# Patient Record
Sex: FEMALE | Race: WHITE | Employment: FULL TIME | URBAN - METROPOLITAN AREA
[De-identification: names, ages, dates, MRNs, and addresses within clinical notes are randomized per-mention and may not be internally consistent; named-entity substitution may affect disease eponyms.]

---

## 2021-01-28 DIAGNOSIS — F17.200 TOBACCO DEPENDENCE: ICD-10-CM

## 2021-01-28 DIAGNOSIS — I10 ESSENTIAL HYPERTENSION: ICD-10-CM

## 2021-01-28 RX ORDER — OXYBUTYNIN CHLORIDE 5 MG/1
5 TABLET ORAL 3 TIMES DAILY
COMMUNITY

## 2021-01-28 RX ORDER — PAROXETINE 30 MG/1
30 TABLET, FILM COATED ORAL EVERY MORNING
COMMUNITY

## 2021-01-28 RX ORDER — LOSARTAN POTASSIUM AND HYDROCHLOROTHIAZIDE 25; 100 MG/1; MG/1
1 TABLET ORAL DAILY
COMMUNITY

## 2021-02-01 DIAGNOSIS — Z01.818 PRE-OP TESTING: Primary | ICD-10-CM

## 2021-02-03 ENCOUNTER — TELEPHONE (OUTPATIENT)
Dept: GASTROENTEROLOGY | Age: 61
End: 2021-02-03

## 2021-02-10 ENCOUNTER — TELEPHONE (OUTPATIENT)
Dept: ENDOSCOPY | Age: 61
End: 2021-02-10

## 2021-02-24 ENCOUNTER — OFFICE VISIT (OUTPATIENT)
Dept: CARDIOLOGY CLINIC | Age: 61
End: 2021-02-24
Payer: MEDICARE

## 2021-02-24 VITALS
SYSTOLIC BLOOD PRESSURE: 130 MMHG | DIASTOLIC BLOOD PRESSURE: 70 MMHG | HEART RATE: 70 BPM | TEMPERATURE: 97.1 F | WEIGHT: 184 LBS | OXYGEN SATURATION: 96 %

## 2021-02-24 DIAGNOSIS — I10 HYPERTENSION, UNSPECIFIED TYPE: ICD-10-CM

## 2021-02-24 DIAGNOSIS — I20.8 ANGINA AT REST (HCC): Primary | ICD-10-CM

## 2021-02-24 PROCEDURE — G8421 BMI NOT CALCULATED: HCPCS | Performed by: INTERNAL MEDICINE

## 2021-02-24 PROCEDURE — 99244 OFF/OP CNSLTJ NEW/EST MOD 40: CPT | Performed by: INTERNAL MEDICINE

## 2021-02-24 PROCEDURE — G8427 DOCREV CUR MEDS BY ELIG CLIN: HCPCS | Performed by: INTERNAL MEDICINE

## 2021-02-24 PROCEDURE — G8484 FLU IMMUNIZE NO ADMIN: HCPCS | Performed by: INTERNAL MEDICINE

## 2021-02-24 ASSESSMENT — ENCOUNTER SYMPTOMS
ALLERGIC/IMMUNOLOGIC NEGATIVE: 1
VOMITING: 0
ANAL BLEEDING: 0
VOICE CHANGE: 0
APNEA: 0
COLOR CHANGE: 0
CHEST TIGHTNESS: 0
EYES NEGATIVE: 1
TROUBLE SWALLOWING: 0
GASTROINTESTINAL NEGATIVE: 1
ABDOMINAL DISTENTION: 0
FACIAL SWELLING: 0
NAUSEA: 0
WHEEZING: 0
BLOOD IN STOOL: 0
SHORTNESS OF BREATH: 0

## 2021-02-24 NOTE — PROGRESS NOTES
[facility] CARDIOLOGY OFFICE FOLLOW-UP      Patient: Lobito Carolina  YOB: 1960  MRN: [id number]    Chief Complaint:  Chief Complaint   Patient presents with    New Patient     Rosana Frederick referred ABN EKG         Subjective/HPI:  2/23/21: Patient presents today for evaluation of abnormal EKG. Consulted by Rosana Frederick. 56years old white female. Has history of hypertension. And smokes. Has had endoscopy with Dr. Briana Palomo and referred for evaluation of chest pain. Has vague chest discomfort whether is due to reflux or angina has no notes. She has moderate risk for coronary artery disease. Scheduled to have a [employer] [place] [employer] and echo and then see me         Past Medical History:   Diagnosis Date    Essential hypertension     Obesity     Tobacco dependence        No past surgical history on file. No family history on file. Social History     Socioeconomic History    Marital status: Single     Spouse name: Not on file    Number of children: Not on file    Years of education: Not on file    Highest education level: Not on file   Occupational History    Not on file   Social Needs    Financial resource strain: Not on file    Food insecurity     Worry: Not on file     Inability: Not on file    Transportation needs     Medical: Not on file     Non-medical: Not on file   Tobacco Use    Smoking status: Former Smoker    Smokeless tobacco: Current User   Substance and Sexual Activity    Alcohol use:  Yes    Drug use: Never    Sexual activity: Not on file   Lifestyle    Physical activity     Days per week: Not on file     Minutes per session: Not on file    Stress: Not on file   Relationships    Social connections     Talks on phone: Not on file     Gets together: Not on file     Attends Zoroastrian service: Not on file     Active member of club or organization: Not on file     Attends meetings of clubs or organizations: Not on file     Relationship status: Not on file    Intimate partner violence     Fear of current or ex partner: Not on file     Emotionally abused: Not on file     Physically abused: Not on file     Forced sexual activity: Not on file   Other Topics Concern    Not on file   Social History Narrative    Not on file       Allergies   Allergen Reactions    Latex Rash    Lisinopril        Current Outpatient Medications   Medication Sig Dispense Refill    losartan-hydroCHLOROthiazide (HYZAAR) 100-25 MG per tablet Take 1 tablet by mouth daily      oxybutynin (DITROPAN) 5 MG tablet Take 5 mg by mouth 3 times daily      PARoxetine (PAXIL) 30 MG tablet Take 30 mg by mouth every morning       No current facility-administered medications for this visit. Review of Systems:   Review of Systems   Constitutional: Negative for activity change, appetite change, diaphoresis, fatigue and unexpected weight change. HENT: Negative. Negative for facial swelling, nosebleeds, trouble swallowing and voice change. Eyes: Negative. Respiratory: Negative for apnea, chest tightness, shortness of breath and wheezing. Cardiovascular: Negative for chest pain, palpitations and leg swelling. Gastrointestinal: Negative. Negative for abdominal distention, anal bleeding, blood in stool, nausea and vomiting. Endocrine: Negative. Genitourinary: Negative. Negative for decreased urine volume and dysuria. Musculoskeletal: Negative for gait problem and myalgias. Skin: Negative. Negative for color change, pallor, rash and wound. Allergic/Immunologic: Negative. Neurological: Negative for dizziness, syncope, facial asymmetry, weakness, light-headedness, numbness and headaches. Hematological: Does not bruise/bleed easily. Psychiatric/Behavioral: Negative. Negative for agitation, behavioral problems, confusion, hallucinations and suicidal ideas. The patient is not nervous/anxious and is not hyperactive. All other systems reviewed and are negative.       Review of System is negative except for as mentioned above. Physical Examination:    /70 (Site: Right Upper Arm, Position: Sitting, Cuff Size: Large Adult)   Pulse 70   Temp 97.1 °F (36.2 °C) (Infrared)   Wt 184 lb (83.5 kg)   SpO2 96%    Physical Exam        Patient Active Problem List   Diagnosis    Tobacco dependence    Obesity    Essential hypertension           Orders Placed This Encounter   Procedures    NM Myocardial Spect Rest Exercise or Rx     Standing Status:   Future     Standing Expiration Date:   2/24/2022     Order Specific Question:   Reason for Exam?     Answer:   Angina     Order Specific Question:   Procedure Type     Answer:   Rx    Echo 2D w doppler w color complete     Standing Status:   Future     Standing Expiration Date:   2/24/2022     Order Specific Question:   Reason for exam:     Answer:   angina         No orders of the defined types were placed in this encounter. Assessment/Orders:       ICD-10-CM    1. Angina at rest [facility])  I20.8 NM Myocardial Spect Rest Exercise or Rx     Echo 2D w doppler w color complete   2. Hypertension, unspecified type  I10 NM Myocardial Spect Rest Exercise or Rx     Echo 2D w doppler w color complete       No orders of the defined types were placed in this encounter. There are no discontinued medications. Orders Placed This Encounter   Procedures    NM Myocardial Spect Rest Exercise or Rx     Standing Status:   Future     Standing Expiration Date:   2/24/2022     Order Specific Question:   Reason for Exam?     Answer:   Angina     Order Specific Question:   Procedure Type     Answer:   Rx    Echo 2D w doppler w color complete     Standing Status:   Future     Standing Expiration Date:   2/24/2022     Order Specific Question:   Reason for exam:     Answer:   angina         Plan:  Patient to have Echo and Jar[facility] Stress tests    Stay on same medications.     See me in 3 weeks after testing        Electronically signed by: Rosa Isela Yip MD 3/3/2021 7:16 AM

## 2021-03-17 ENCOUNTER — NURSE ONLY (OUTPATIENT)
Dept: PRIMARY CARE CLINIC | Age: 61
End: 2021-03-17

## 2021-03-17 DIAGNOSIS — Z01.818 PRE-OP TESTING: ICD-10-CM

## 2021-03-18 LAB
SARS-COV-2: NOT DETECTED
SOURCE: NORMAL

## 2021-03-24 ENCOUNTER — HOSPITAL ENCOUNTER (OUTPATIENT)
Age: 61
Setting detail: OUTPATIENT SURGERY
Discharge: HOME | End: 2021-03-24
Attending: INTERNAL MEDICINE | Admitting: INTERNAL MEDICINE
Payer: MEDICARE

## 2021-03-24 ENCOUNTER — ANESTHESIA (OUTPATIENT)
Dept: ENDOSCOPY | Age: 61
End: 2021-03-24
Payer: MEDICARE

## 2021-03-24 ENCOUNTER — ANCILLARY PROCEDURE (OUTPATIENT)
Dept: ENDOSCOPY | Age: 61
End: 2021-03-24
Attending: INTERNAL MEDICINE
Payer: MEDICARE

## 2021-03-24 ENCOUNTER — ANESTHESIA EVENT (OUTPATIENT)
Dept: ENDOSCOPY | Age: 61
End: 2021-03-24
Payer: MEDICARE

## 2021-03-24 VITALS
TEMPERATURE: 98.5 F | RESPIRATION RATE: 16 BRPM | BODY MASS INDEX: 30.73 KG/M2 | DIASTOLIC BLOOD PRESSURE: 67 MMHG | WEIGHT: 180 LBS | SYSTOLIC BLOOD PRESSURE: 147 MMHG | HEART RATE: 52 BPM | HEIGHT: 64 IN | OXYGEN SATURATION: 100 %

## 2021-03-24 VITALS
DIASTOLIC BLOOD PRESSURE: 72 MMHG | SYSTOLIC BLOOD PRESSURE: 138 MMHG | RESPIRATION RATE: 15 BRPM | OXYGEN SATURATION: 99 %

## 2021-03-24 PROCEDURE — 45380 COLONOSCOPY AND BIOPSY: CPT | Performed by: INTERNAL MEDICINE

## 2021-03-24 PROCEDURE — 88305 TISSUE EXAM BY PATHOLOGIST: CPT

## 2021-03-24 PROCEDURE — 3700000001 HC ADD 15 MINUTES (ANESTHESIA): Performed by: INTERNAL MEDICINE

## 2021-03-24 PROCEDURE — 2580000003 HC RX 258

## 2021-03-24 PROCEDURE — 3700000000 HC ANESTHESIA ATTENDED CARE: Performed by: INTERNAL MEDICINE

## 2021-03-24 PROCEDURE — 2580000003 HC RX 258: Performed by: NURSE ANESTHETIST, CERTIFIED REGISTERED

## 2021-03-24 PROCEDURE — 7100000010 HC PHASE II RECOVERY - FIRST 15 MIN: Performed by: INTERNAL MEDICINE

## 2021-03-24 PROCEDURE — 3609027000 HC COLONOSCOPY: Performed by: INTERNAL MEDICINE

## 2021-03-24 PROCEDURE — 2709999900 HC NON-CHARGEABLE SUPPLY: Performed by: INTERNAL MEDICINE

## 2021-03-24 PROCEDURE — 2580000003 HC RX 258: Performed by: INTERNAL MEDICINE

## 2021-03-24 RX ORDER — MAGNESIUM HYDROXIDE 1200 MG/15ML
LIQUID ORAL PRN
Status: DISCONTINUED | OUTPATIENT
Start: 2021-03-24 | End: 2021-03-24 | Stop reason: ALTCHOICE

## 2021-03-24 RX ORDER — SODIUM CHLORIDE 9 MG/ML
INJECTION, SOLUTION INTRAVENOUS
Status: DISCONTINUED
Start: 2021-03-24 | End: 2021-03-24 | Stop reason: HOSPADM

## 2021-03-24 RX ORDER — 0.9 % SODIUM CHLORIDE 0.9 %
500 INTRAVENOUS SOLUTION INTRAVENOUS ONCE
Status: DISCONTINUED | OUTPATIENT
Start: 2021-03-24 | End: 2021-03-24 | Stop reason: HOSPADM

## 2021-03-24 RX ORDER — SODIUM CHLORIDE 9 MG/ML
INJECTION, SOLUTION INTRAVENOUS CONTINUOUS PRN
Status: DISCONTINUED | OUTPATIENT
Start: 2021-03-24 | End: 2021-03-24 | Stop reason: SDUPTHER

## 2021-03-24 RX ORDER — PROPOFOL 10 MG/ML
INJECTION, EMULSION INTRAVENOUS CONTINUOUS PRN
Status: DISCONTINUED | OUTPATIENT
Start: 2021-03-24 | End: 2021-03-24 | Stop reason: SDUPTHER

## 2021-03-24 RX ADMIN — SODIUM CHLORIDE 500 ML: 9 INJECTION, SOLUTION INTRAVENOUS at 11:34

## 2021-03-24 RX ADMIN — PROPOFOL 120 MCG/KG/MIN: 10 INJECTION, EMULSION INTRAVENOUS at 12:11

## 2021-03-24 RX ADMIN — Medication 500 ML: at 11:34

## 2021-03-24 RX ADMIN — SODIUM CHLORIDE: 9 INJECTION, SOLUTION INTRAVENOUS at 12:04

## 2021-03-24 ASSESSMENT — PAIN SCALES - GENERAL
PAINLEVEL_OUTOF10: 0
PAINLEVEL_OUTOF10: 0

## 2021-03-24 ASSESSMENT — PAIN - FUNCTIONAL ASSESSMENT: PAIN_FUNCTIONAL_ASSESSMENT: 0-10

## 2021-03-24 NOTE — H&P
Patient Name: Tamika Ramos  :   MRN: [id number]  DATE: 21      ENDOSCOPY  History and Physical    Procedure:    [] Diagnostic Colonoscopy       [x] Screening Colonoscopy  [] EGD      [] ERCP      [] EUS       [] Other    [x] Previous office notes/History and Physical reviewed from the patients chart. Please see EMR for further details of HPI. I have examined the patient's status immediately prior to the procedure and:      Indications/HPI:    []Abdominal Pain   []Cancer- GI/Lung  []Fhx of colon CA  []History of Polyps   []Tejedas   []Melena  []Abnormal Imaging   []Dysphagia    []Persistent Pneumonia  []Anemia   []Food Impaction  []History of Polyps  []GI Bleed   []Pulmonary nodule/Mass  []Change in bowel habits  []Heartburn/Reflux  []Rectal Bleed (BRBPR)  []Chest Pain - Non Cardiac  []Heme (+) Stool  []Ulcers  []Constipation   []Hemoptysis   []Varices  []Diarrhea   []Hypoxemia  []Nausea/Vomiting   [x]Screening   []Crohns/Colitis  []Other:    Anesthesia:   [x] MAC [] Moderate Sedation   [] General   [] None     ROS: 12 pt Review of Symptoms was negative unless mentioned above    Medications:   Prior to Admission medications    Medication Sig Start Date End Date Taking? Authorizing Provider   losartan-hydroCHLOROthiazide (HYZAAR) 100-25 MG per tablet Take 1 tablet by mouth daily   Yes Historical Provider, MD   oxybutynin (DITROPAN) 5 MG tablet Take 5 mg by mouth 3 times daily   Yes Historical Provider, MD   PARoxetine (PAXIL) 30 MG tablet Take 30 mg by mouth every morning    Historical Provider, MD       Allergies:    Allergies   Allergen Reactions    Latex Rash    Lisinopril         History of allergic reaction to anesthesia:  No    Past Medical History:  Past Medical History:   Diagnosis Date    Essential hypertension     Obesity     Tobacco dependence        Past Surgical History:  Past Surgical History:   Procedure Laterality Date    HYSTERECTOMY      TONSILLECTOMY         Social History:  Social History     Tobacco Use    Smoking status: Former Smoker     Packs/day: 0.50     Types: Cigarettes     Quit date: 3/24/2009     Years since quittin.0    Smokeless tobacco: Never Used   Substance Use Topics    Alcohol use: Yes     Alcohol/week: 4.0 standard drinks     Types: 4 Cans of beer per week     Comment: weekly    Drug use: Never       Vital Signs:   Vitals:    21 1118   BP: (!) 165/82   Pulse: 57   Resp: 18   Temp: 98.5 °F (36.9 °C)   SpO2: 97%        Physical Exam:  Cardiac:  [x]WNL []Comments:  Pulmonary:  [x]WNL   []Comments:   Neuro/Mental Status:  [x]WNL  []Comments:  Abdominal:  [x]WNL    []Comments:  Other:   []WNL  []Comments:    Informed Consent:  The risks and benefits of the procedure have been discussed with either the patient or if they cannot consent, their representative. Assessment:  Patient examined and appropriate for planned sedation and procedure. Plan:  Proceed with planned sedation and procedure as above. The patient was counseled at length about risks of bonnie COVID-19 in the perioperative and any recovery window from the procedure. The patient was made aware that bonnie COVID-19 may worsen their prognosis for recovery from their procedure and lend to a higher morbidity and-all mortality risk. The patient was given the option of postponing the procedure all material risks, benefits, and alternatives were discussed. The patient does wish to proceed with the procedure at this time.     David Cheema MD  11:35 AM

## 2021-03-24 NOTE — ANESTHESIA POSTPROCEDURE EVALUATION
Department of Anesthesiology  Postprocedure Note    Patient: Dolores Darden  MRN: [id number]  YOB: 1960  Date of evaluation: 3/24/2021  Time:  12:26 PM     Procedure Summary     Date: 03/24/21 Room / Location: [address] [place] [address]    Anesthesia Start: 5489 Anesthesia Stop: 2788    Procedure: COLORECTAL CANCER SCREENING, NOT HIGH RISK (N/A ) Diagnosis: (colon cancer screening Z12.11)    Surgeons: Unruly Yanez MD Responsible Provider: TOM Trevino CRNA    Anesthesia Type: MAC ASA Status: 3          Anesthesia Type: MAC    Janey Phase I: Janey Score: 10    Janey Phase II:      Last vitals: Reviewed and per EMR flowsheets.        Anesthesia Post Evaluation    Patient location during evaluation: PACU  Patient participation: complete - patient participated  Level of consciousness: awake and alert  Pain score: 0  Airway patency: patent  Nausea & Vomiting: no nausea and no vomiting  Complications: no  Cardiovascular status: blood pressure returned to baseline and hemodynamically stable  Respiratory status: acceptable, spontaneous ventilation, nonlabored ventilation and nasal cannula  Hydration status: euvolemic

## 2021-03-24 NOTE — ANESTHESIA PRE PROCEDURE
Department of Anesthesiology  Preprocedure Note       Name:  Kelly Graham   Age:  56 y.o.  :  1960                                          MRN:  [id number]         Date:  3/24/2021      Surgeon: Melani Sumner):  Pavel Fernandes MD    Procedure: Procedure(s):  COLORECTAL CANCER SCREENING, NOT HIGH RISK    Medications prior to admission:   Prior to Admission medications    Medication Sig Start Date End Date Taking? Authorizing Provider   losartan-hydroCHLOROthiazide (HYZAAR) 100-25 MG per tablet Take 1 tablet by mouth daily   Yes Historical Provider, MD   oxybutynin (DITROPAN) 5 MG tablet Take 5 mg by mouth 3 times daily   Yes Historical Provider, MD   PARoxetine (PAXIL) 30 MG tablet Take 30 mg by mouth every morning    Historical Provider, MD       Current medications:    Current Facility-Administered Medications   Medication Dose Route Frequency Provider Last Rate Last Admin    0.9 % sodium chloride bolus  500 mL Intravenous Once Pavel Fernandes  mL/hr at 21 1134 500 mL at 21 1134    sterile water for irrigation    PRN Pavel Fernandes MD   1,000 mL at 21 1157       Allergies: Allergies   Allergen Reactions    Latex Rash    Lisinopril        Problem List:    Patient Active Problem List   Diagnosis Code    Tobacco dependence F17.200    Obesity E66.9    Essential hypertension I10       Past Medical History:        Diagnosis Date    Essential hypertension     Obesity     Tobacco dependence        Past Surgical History:        Procedure Laterality Date    HYSTERECTOMY      TONSILLECTOMY         Social History:    Social History     Tobacco Use    Smoking status: Former Smoker     Packs/day: 0.50     Types: Cigarettes     Quit date: 3/24/2009     Years since quittin.0    Smokeless tobacco: Never Used   Substance Use Topics    Alcohol use:  Yes     Alcohol/week: 4.0 standard drinks     Types: 4 Cans of beer per week     Comment: weekly                                Counseling given: Not Answered      Vital Signs (Current):   Vitals:    03/24/21 1118   BP: (!) 165/82   Pulse: 57   Resp: 18   Temp: 36.9 °C (98.5 °F)   TempSrc: Temporal   SpO2: 97%   Weight: 180 lb (81.6 kg)   Height: 5' 4\" (1.626 m)                                              BP Readings from Last 3 Encounters:   03/24/21 (!) 165/82   02/24/21 130/70       NPO Status: Time of last liquid consumption: 0800                        Time of last solid consumption: 1600                        Date of last liquid consumption: 03/24/21                        Date of last solid food consumption: 03/22/21    BMI:   Wt Readings from Last 3 Encounters:   03/24/21 180 lb (81.6 kg)   02/24/21 184 lb (83.5 kg)     Body mass index is 30.9 kg/m². CBC: No results found for: WBC, RBC, HGB, HCT, MCV, RDW, PLT    CMP: No results found for: NA, K, CL, CO2, BUN, CREATININE, GFRAA, AGRATIO, LABGLOM, GLUCOSE, PROT, CALCIUM, BILITOT, ALKPHOS, AST, ALT    POC Tests: No results for input(s): POCGLU, POCNA, POCK, POCCL, POCBUN, POCHEMO, POCHCT in the last 72 hours.     Coags: No results found for: PROTIME, INR, APTT    HCG (If Applicable): No results found for: PREGTESTUR, PREGSERUM, HCG, HCGQUANT     ABGs: No results found for: PHART, PO2ART, PTP9LGW, NKK8QBP, BEART, M5UNREEY     Type & Screen (If Applicable):  No results found for: LABABO, LABRH    Drug/Infectious Status (If Applicable):  No results found for: HIV, HEPCAB    COVID-19 Screening (If Applicable):   Lab Results   Component Value Date    COVID19 Not Detected 03/17/2021           Anesthesia Evaluation  Patient summary reviewed and Nursing notes reviewed  Airway: Mallampati: II  TM distance: >3 FB   Neck ROM: full  Mouth opening: > = 3 FB Dental: normal exam         Pulmonary:                              Cardiovascular:                      Neuro/Psych:               GI/Hepatic/Renal:             Endo/Other:                     Abdominal:           Vascular: Anesthesia Plan      MAC     ASA 3             Anesthetic plan and risks discussed with patient. Use of blood products discussed with patient whom consented to blood products.                    TOM Avalos - CRNA   3/24/2021

## 2021-11-08 ENCOUNTER — OFFICE VISIT (OUTPATIENT)
Dept: GASTROENTEROLOGY | Age: 61
End: 2021-11-08
Payer: MEDICARE

## 2021-11-08 VITALS — BODY MASS INDEX: 30.56 KG/M2 | WEIGHT: 179 LBS | HEIGHT: 64 IN | OXYGEN SATURATION: 100 % | HEART RATE: 78 BPM

## 2021-11-08 DIAGNOSIS — R11.0 NAUSEA: ICD-10-CM

## 2021-11-08 DIAGNOSIS — R10.9 ABDOMINAL PAIN, UNSPECIFIED ABDOMINAL LOCATION: ICD-10-CM

## 2021-11-08 DIAGNOSIS — K52.9 CHRONIC DIARRHEA: Primary | ICD-10-CM

## 2021-11-08 DIAGNOSIS — F10.11 HISTORY OF ALCOHOL ABUSE: ICD-10-CM

## 2021-11-08 DIAGNOSIS — K43.9 VENTRAL HERNIA WITHOUT OBSTRUCTION OR GANGRENE: ICD-10-CM

## 2021-11-08 PROCEDURE — G8484 FLU IMMUNIZE NO ADMIN: HCPCS | Performed by: NURSE PRACTITIONER

## 2021-11-08 PROCEDURE — 99214 OFFICE O/P EST MOD 30 MIN: CPT | Performed by: NURSE PRACTITIONER

## 2021-11-08 PROCEDURE — 3017F COLORECTAL CA SCREEN DOC REV: CPT | Performed by: NURSE PRACTITIONER

## 2021-11-08 PROCEDURE — G8417 CALC BMI ABV UP PARAM F/U: HCPCS | Performed by: NURSE PRACTITIONER

## 2021-11-08 PROCEDURE — G8427 DOCREV CUR MEDS BY ELIG CLIN: HCPCS | Performed by: NURSE PRACTITIONER

## 2021-11-08 PROCEDURE — 1036F TOBACCO NON-USER: CPT | Performed by: NURSE PRACTITIONER

## 2021-11-08 RX ORDER — OXYBUTYNIN CHLORIDE 5 MG/1
TABLET, EXTENDED RELEASE ORAL
COMMUNITY
Start: 2021-11-03 | End: 2022-01-07

## 2021-11-08 ASSESSMENT — ENCOUNTER SYMPTOMS
BLOOD IN STOOL: 0
RECTAL PAIN: 0
TROUBLE SWALLOWING: 0
DIARRHEA: 1
CONSTIPATION: 0
VOMITING: 0
EYES NEGATIVE: 1
ANAL BLEEDING: 0
NAUSEA: 1
ABDOMINAL DISTENTION: 0
VOICE CHANGE: 0
CHOKING: 0
ABDOMINAL PAIN: 0

## 2021-11-08 NOTE — PROGRESS NOTES
Gastroenterology Clinic Visit    Onofre Keith  [id number]  Chief Complaint   Patient presents with    Follow-up     Nausea, diarrhea     HPI: 59 y.o. female presents to the clinic, referred by her PCP at [facility] and [address] [place] [address], for generalized abdominal pain. Patient denies experiencing abdominal pain, except when she has to move/lift things at work. States her PCP diagnosed her with an abdominal hernia. Patient reports experiencing nausea (without vomiting) couple of times per week. She states nothing makes this worse or better. She denies GERD symptoms, vomiting, hematemesis, dysphagia or unintentional weight loss. She also reports longstanding history of diarrhea that began in her early 46s. States that she used to have 6-8 liquid/loose bowel movements per day. She reports being a heavy alcohol drinker off and on since her early 25s. States she quit drinking alcohol in July of this year and since that time her bowel movements have decreased to around 3 per day. She does report her first bowel movement of the day being soft/firm. However, after she eats she proceeds to have 1-2 episodes of diarrhea per day with occasional incontinence to feces. She uses Imodium as needed when going out. States she does not eat much while at work for fear of diarrhea with fecal incontinence. She denies hematochezia, melena or constipation. She reports her weight is stable. However, she works in [employer] and reports indulging on sweets since she quit drinking alcohol. She is a former smoker, history of EtOH abuse (quit in July of this year), denies illicit drug use. Denies NSAID use. She does report history of uterine cancer, s/p hysterectomy and radiation therapy in 2005. She reports her PCP has ordered multiple tests including CT of the abdomen pelvis, FibroScan of her liver, CBC, CMP, amylase and lipase. States she has not completed these tests yet. However, will do so in the near future.     Previous GI work up/Endoscopic investigations:   Colonoscopy 3/24/21: Diminutive ascending colon polyp, resected and retrieved with biopsy forceps. Left-sided diverticulosis. Medium-sized external hemorrhoids. Pathology: Ascending colon polyptubular adenoma. Colonic mucosa with small lymphoid nodule. Review of Systems   Constitutional: Negative for appetite change, fatigue, fever and unexpected weight change. HENT: Negative for trouble swallowing and voice change. Eyes: Negative. Respiratory: Negative for choking. Cardiovascular: Negative. Gastrointestinal: Positive for diarrhea and nausea. Negative for abdominal distention, abdominal pain, anal bleeding, blood in stool, constipation, rectal pain and vomiting. Endocrine: Negative. Genitourinary: Negative. Musculoskeletal: Negative. Skin: Negative. Allergic/Immunologic: Negative for food allergies. Neurological: Negative. Hematological: Negative. Psychiatric/Behavioral: Negative for agitation, decreased concentration, self-injury, sleep disturbance and suicidal ideas. The patient is not nervous/anxious. Past Medical History:   Diagnosis Date    Essential hypertension     Obesity     Tobacco dependence      Past Surgical History:   Procedure Laterality Date    COLONOSCOPY N/A 3/24/2021    COLORECTAL CANCER SCREENING, NOT HIGH RISK performed by Sherlyn Barreto MD at [address] [place] [address]       Current Outpatient Medications on File Prior to Visit   Medication Sig Dispense Refill    losartan-hydroCHLOROthiazide (HYZAAR) 100-25 MG per tablet Take 1 tablet by mouth daily      oxybutynin (DITROPAN) 5 MG tablet Take 5 mg by mouth 3 times daily      PARoxetine (PAXIL) 30 MG tablet Take 30 mg by mouth every morning      oxybutynin (DITROPAN-XL) 5 MG extended release tablet  (Patient not taking: Reported on 11/8/2021)       No current facility-administered medications on file prior to visit. Stability:     Unable to Pay for Housing in the Last Year: Not on file    Number of Places Lived in the Last Year: Not on file    Unstable Housing in the Last Year: Not on file     Pulse 78, height 5' 4\" (1.626 m), weight 179 lb (81.2 kg), SpO2 100 %. Physical Exam  Constitutional:       General: She is not in acute distress. Appearance: Normal appearance. She is normal weight. She is not ill-appearing. HENT:      Head: Normocephalic and atraumatic. Eyes:      General: No scleral icterus. Cardiovascular:      Rate and Rhythm: Normal rate and regular rhythm. Pulses: Normal pulses. Pulmonary:      Effort: Pulmonary effort is normal. No respiratory distress. Breath sounds: Normal breath sounds. Abdominal:      General: Abdomen is protuberant. Bowel sounds are normal. There is no distension. Palpations: Abdomen is soft. There is no mass. Tenderness: There is no abdominal tenderness. There is no guarding or rebound. Hernia: A hernia is present. Hernia is present in the ventral area. Comments: Central obesity   Musculoskeletal:         General: Normal range of motion. Lymphadenopathy:      Cervical: No cervical adenopathy. Skin:     General: Skin is warm and dry. Coloration: Skin is not jaundiced. Neurological:      Mental Status: She is alert and oriented to person, place, and time. Psychiatric:         Mood and Affect: Mood normal.         Behavior: Behavior normal.         Thought Content: Thought content normal.         Judgment: Judgment normal.       Laboratory, Pathology, Radiology reviewed indetail with relevant important investigations summarized below:    No recent laboratory results found. Patient has some ordered by her PCP and will complete today. No recent GI imaging results found. Assessment and Plan:  59 y.o. female presents to the GI clinic with abdominal pain with movement, nausea, and chronic diarrhea.       1. Abdominal pain, unspecified abdominal location  2. Ventral hernia without obstruction or gangrene  -Physical exam revealing a fairly large, nonreducible ventral hernia.   -Will await CT completion of her previously ordered CT of the abdomen/pelvis. -Referral made to general surgery, Dr. Shannan Silva, for evaluation of hernia/possible repair. 3. Nausea  -Nausea is occasional, no vomiting, GERD symptoms or dysphagia. Will continue to monitor. 4. Chronic diarrhea  -Suspect chronic diarrhea is diet-related as patient admits to improved consistency of bowel movements after cessation of alcohol use. Recent colonoscopy in March of this year revealing 2 small polyps, left-sided diverticulosis, and medium sized external hemorrhoids. She continues to have a few episodes of daily diarrhea with occasional fecal incontinence. 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    -Will check stool pH, potassium, sodium to further evaluate.   -Discussed with patient the importance of abstaining from dietary sugars/artificial sweeteners as well and to monitor her symptoms.  -Recommend starting fiber supplementation, 1 spoonful in 8 ounces of water daily, titrate upwards as needed to produce 1-2 formed bowel movements per day. -Continue Imodium OTC as needed    5. History of alcohol abuse  -Will await lab work (Sebast[facility] Ultr[facility] 112) and fibroscan ordered by patient's PCP. Further recommendations pending patient's clinical course. Return for Follow up in GI clinic in 4-6 weeks.     TOM Walker - [facility]   Staff Gastroenterology Nurse Practitioner  [facility]

## 2021-11-08 NOTE — PATIENT INSTRUCTIONS
-Start fiber supplement 1 spoonful daily, can go up or down as needed. - Get Ct of the abdomen and fibsroscan done as ordered by Dr. Jahaira Ashley, obtain copies if not done at [facility]. -Get labwork done as ordered by Dr. Jahaira Ashley.     -Stool studies, make sure it is a loose stool, not formed.

## 2021-11-10 DIAGNOSIS — K52.9 CHRONIC DIARRHEA: ICD-10-CM

## 2021-11-13 LAB — FECAL PH: 7 (ref 5–8.5)

## 2021-12-08 ENCOUNTER — HOSPITAL ENCOUNTER (OUTPATIENT)
Dept: PHYSICAL THERAPY | Age: 61
Setting detail: THERAPIES SERIES
Discharge: HOME | End: 2021-12-08
Payer: MEDICARE

## 2021-12-08 PROCEDURE — 97750 PHYSICAL PERFORMANCE TEST: CPT | Performed by: PHYSICAL THERAPIST

## 2021-12-08 NOTE — PROGRESS NOTES
FUNCTIONAL CAPACITIES EVALUATION  DISABILITY      CLIENT:   Shila Pod    DATE:    12/8/2021    DIAGNOSIS:   Ventral hernia, obesity     REFERRAL SOURCE:  Barb SANTOS  PAYMENT SOURCE:  Payor: [employer]     YOB: 1960    MEDICAL HISTORY    Age:     59 y.o. Medical History:     Past Medical History:   Diagnosis Date    Essential hypertension     Obesity     Tobacco dependence        Previous injuries or surgery:   Past Surgical History:   Procedure Laterality Date    COLONOSCOPY N/A 3/24/2021    COLORECTAL CANCER SCREENING, NOT HIGH RISK performed by Ragini Solomon MD at [address] [place] [address]          Past medical problems:   Patient is here today for a FCE to determine her ability to work. She works at [employer] and [employer], but must lift, push carts, and jannet out garbage. When she does this she gets a lot of pressure through her ventral hernia. She is scheduled to see a surgeon in the next week or so to see if she will be a surgical candidate. She also states that she quit drinking about six months ago. Today she states she does not have any pain. Current Medications:     Current Outpatient Medications   Medication Sig Dispense Refill    oxybutynin (DITROPAN-XL) 5 MG extended release tablet  (Patient not taking: Reported on 11/8/2021)      losartan-hydroCHLOROthiazide (HYZAAR) 100-25 MG per tablet Take 1 tablet by mouth daily      oxybutynin (DITROPAN) 5 MG tablet Take 5 mg by mouth 3 times daily      PARoxetine (PAXIL) 30 MG tablet Take 30 mg by mouth every morning       No current facility-administered medications for this encounter. WORK HISTORY    Job title:    [employer]  How long at current job: 3 years  Off work since:   Currently working  Previous work:   [employer].   She was a  for 30 years in [employer]:  Lives with alone in a 1 level home   Self-Care Dressing Independent    Bathing Independent   Home Management Cooking Independent    Cleaning Independent    Laundry Independent   Sleep Good: 9 hours  Bad:1 hours      Driving Distance Independent       Educational Background:      AS in Photography from [address]  right handed    RANGE OF MOTION/FLEXIBILITY    Upper Extremity WNL - Within Normal Limits WFL - Within Functional Limits  *Strength grades:  0=Absent    1=Trace    2=Poor    3=Fair    4=Good    5=Normal*  RIGHT ROM STRENGTH LEFT ROM STRENGTH   Shoulder Shoulder   Flexion WNL 4/5 Flexion WNL 4/5   Extension WNL 4/5 Extension WNL 4/5   Abduction WNL 4/5 Abduction WNL 4/5   Ext. Rot. WNL 4/5 Ext. Rot. WNL 4/5   Int. Rot. WNL 4/5 Int. Rot. WNL 4/5   Elbow Elbow   Flexion WNL 4/5 Flexion WNL 4/5   Extension WNL 4/5 Extension WNL 4/5   Forearm Forearm   Supination WNL 4/5 Supination WNL 4/5   Pronation WNL 4/5 Pronation WNL 4/5   Wrist Wrist   Flexion WNL 4/5 Flexion WNL 4/5   Extension WNL 4/5 Extension WNL 4/5   Radial Dev WNL 4/5 Radial Dev WNL 4/5   Ulnar Dev WNL 4/5 Ulnar Dev WNL 4/5   Hand  Hand    Finger flex/ext. WNL 4/5 Finger flex/ext.  WNL 4/5     Lower Extremity  RIGHT ROM STRENGTH LEFT ROM STRENGTH   Hip Hip   SLR 0-90 NT SLR 0-90 NT   Flexion 0-110 4/5 Flexion 0-120 4/5   Extension WNL 4/5 Extension WNL 4/5   Abduction WNL 4-/5 Abduction WNL 4-/5   Knee Knee   Flexion WNL 4/5 Flexion WNL 4/5   Extension WNL 4/5 Extension WNL 4/5   Ankle Ankle   Dorsiflexion WNL 5/5 Dorsiflexion WNL 5/5   Plantarflexion WNL 5/5 Plantarflexion WNL 5/5     Trunk Cervical   Flexion  0-40 Flexion  WNL   Extension 0-20 Extension WNL   Rotation R WNL L WNL  Rotation R WNL L WNL    Side bend R WNL L WNL  Side bend R WNL L WNL     Trunk:    Abdominals:  4-/5    Extensors:   4-/5        Strength (pounds)     Setting Right Norm Left Norm   #1 32 Female  age 63-62: 48 lbs 29 Female age 63-62: 45 lbs    #2 43  28    #3 44 36    #4 30  36    #5 26  23    Average 32  31.6      Rapid Exchange :      Right: 40 lbs. Left: 40 lbs. Interpretation: With maximal effort, the curve should be a modified bell-shaped curve. With true weakness, the bell curve is lower, and with simulated weakness, the curve will be that or somewhat curved, but distinguishable from the modified bell-shaped curve of the unaffected hand. Right Norm Left Norm   Palmar Pinch (3 pt) - pounds 15 Female age 63-62: 10.5 lbs  12.5 Female age 63-62: 9.5 lbs    Lateral Pinch - pounds 13.5 Female age 63-62: 11.5 lbs  15 Female age 63-62: 10.5 lbs      Comments: Client did demonstrate maximal effort with  and pinch testing. COORDINATION/DEXTERITY  Test Right Norm Left Norm   9 Hole Peg Test (seconds) 16.4 Female age 63-62: 24.2 s  25.7 Female age 63-62: 20.6 s        POSTURE  A postural screen revealed flattened low back. MATERIAL HANDLING ACTIVITIES  (Weight in pounds)    Lifting Occasional  (1-33% of day) Frequent  (34-55% of day) Constant  (% of day)   12 to knuckle 30 15 6   Knuckle to waist 30 15 6   Waist to chest 20 10 4   Waist to overhead 20 10 4   Carrying 25 12.5 5   Horizontal 35 17.5 7   Pushing 80 40 16   Pulling 80 40 1   For frequent and constant figures, data is extrapolated. The occasional lift is tested by one maximal lift. Client demonstrated  GOOD body mechanics with lifting tasks. Physical Demand Classification: medium  Patient reports that with lifting she gets increased pressure in her abdomen and specifically at the large hernia site.     NON-MATERIAL HANDLING ACTIVITIES    Activity Never Rarely Occasional  (1-33% of day) Frequent  (34-66% of day) Constant  (% of day)   Sitting [] [] [x] [] []   Standing [] [] [] [x] []   Bending [] [] [] [x] []   Reaching (forward) [] [] [] [x] []   Reaching (overhead) [] [] [x] [] []   Stairs [] [] [x] [] []   Ladders [] [x] [] [] []   Squatting [] [] [] [x] []   Kneeling [] [] [x] [] []   Walking [] [] [] [x] []   Balancing [] [x] [] [] []   Repetitive leg [] [] [] [x] []   Repetitive arm [] [] [] [x] []   Hand controls [] [] [] [x] []   Foot controls [] [] [] [x] []   Comments: Non-material handling projections are based upon patient report and observation during the evaluation. SIT AND STAND TOLERANCE    Reported sit:  60 minutes  Reported stand: 5-6 hours ( per her work time currently)  Reported walk:  30 minutes  Actual sit:  30 minutes during testing  Actual stand:  15 minutes during testing  Actual walk:  10 minutes for testing      FUNCTIONAL ASSETS  Prompt to evaluation     Cooperative and able to follow directions, verbalize concerns  Good  and pinch strength  Good upper and lower extremity range of motion and flexibility  Good upper and lower extremity strength  Good lift and carry tolerance for medium physical demand classification  Good knowledge and application of body mechanics with lifting tasks  Good sit/stand tolerance   Good coordination/dexterity     PROBLEMS INTERFERING WITH VOCATIONAL PERFORMANCE  Decreased workplace tolerance in regards to lifting beyond 30#, carrying 20#  Decreased trunk motion        PHYSICAL ABILITIES AND LIMITATIONS  STAND at one time []None []15 min []30 min [x]60 min []2 hrs. []4 hrs   STAND total per 8hr day []None []60 min []2 hrs []4 hrs. [x]6 hrs []8 hrs. SIT at one time []None []15 min []30 min [x]60 min []2 hrs. []4 hrs. SIT total per 8hr day []None []15 min []30 min []60 min []2 hrs. [x]4 hrs. LIFT & CARRY occasionally [] 0-5 lbs. [] 10 lbs. [x] 20 lbs. [] 50 lbs. LIFT & CARRY frequently [] 0-5 lbs. [x] 10 lbs. [] 20 lbs. [] 50 lbs. STOOP [] Never []Occasionally [x]Frequently []Constantly   BALANCE [x] Never []Occasionally [] Frequently []Constantly       ABILITY TO WORK  PART-TIME: Patient demonstrates the physical abilities for the medium job classification.   However due to the hernia and increased pressure when she lifts, would highly recommend that she have a lifting restriction of 30#, and carry 20#. If she requires surgery for her hernia in the near future, then she will need to be on disability until she is cleared by her surgeon to return to work.                        Electronically signed by Leatha Varma PT on 12/8/21 at 10:08 AM EST  Therapist Signature    [facility]     __________________________  Physician Signature

## 2021-12-15 ENCOUNTER — OFFICE VISIT (OUTPATIENT)
Dept: GASTROENTEROLOGY | Age: 61
End: 2021-12-15
Payer: MEDICARE

## 2021-12-15 VITALS — WEIGHT: 179 LBS | HEART RATE: 79 BPM | HEIGHT: 64 IN | OXYGEN SATURATION: 100 % | BODY MASS INDEX: 30.56 KG/M2

## 2021-12-15 DIAGNOSIS — K74.00 HEPATIC FIBROSIS: ICD-10-CM

## 2021-12-15 DIAGNOSIS — F10.11 HISTORY OF ALCOHOL ABUSE: ICD-10-CM

## 2021-12-15 DIAGNOSIS — R11.0 NAUSEA: ICD-10-CM

## 2021-12-15 DIAGNOSIS — R10.9 ABDOMINAL PAIN, UNSPECIFIED ABDOMINAL LOCATION: ICD-10-CM

## 2021-12-15 DIAGNOSIS — R74.01 ELEVATED ALT MEASUREMENT: Primary | ICD-10-CM

## 2021-12-15 DIAGNOSIS — K43.9 VENTRAL HERNIA WITHOUT OBSTRUCTION OR GANGRENE: ICD-10-CM

## 2021-12-15 DIAGNOSIS — K52.9 CHRONIC DIARRHEA: ICD-10-CM

## 2021-12-15 PROCEDURE — 3017F COLORECTAL CA SCREEN DOC REV: CPT | Performed by: NURSE PRACTITIONER

## 2021-12-15 PROCEDURE — 99214 OFFICE O/P EST MOD 30 MIN: CPT | Performed by: NURSE PRACTITIONER

## 2021-12-15 PROCEDURE — 1036F TOBACCO NON-USER: CPT | Performed by: NURSE PRACTITIONER

## 2021-12-15 PROCEDURE — G8417 CALC BMI ABV UP PARAM F/U: HCPCS | Performed by: NURSE PRACTITIONER

## 2021-12-15 PROCEDURE — G8427 DOCREV CUR MEDS BY ELIG CLIN: HCPCS | Performed by: NURSE PRACTITIONER

## 2021-12-15 PROCEDURE — G8484 FLU IMMUNIZE NO ADMIN: HCPCS | Performed by: NURSE PRACTITIONER

## 2021-12-15 NOTE — PROGRESS NOTES
Gastroenterology Clinic Follow up Visit    Natalia Braswell  [id number]  Chief Complaint   Patient presents with    Follow-up     HPI: 59 y.o. female following up after last GI clinic on 11/8/2021. Interval change: Patient reports continued nausea (a couple of times per week). States it seems to worsen prior to having a bowel movement. She denies symptoms of GERD or dysphagia. States that she started taking a single dose of Motrin daily after work for generalized pains. She does endorse abdominal discomfort related to her large ventral hernia. States she has not had the CT abdomen pelvis done yet (ordered by her PCP) as insurance has not yet approved it. She reports she is starting to make an effort to eat healthily/limit portion sizes and increase her physical activity. Although, she has not noticed any weight loss yet. She reports improvement in her diarrheal symptoms. Currently endorsing 2-3 formed/brown/soft bowel movements daily. Has not used Imodium lately. She denies hematochezia or melena. She denies any history of liver disease or family history of hereditary liver disease. Patient's risk factors for liver disease include history of EtOH abuse. She denies IV drug use, intranasal cocaine, tattoos/piercings, high risk sexual behavior, transfusions prior to [facility], or herbal supplements. HPI from last GI clinic visit on 11/8/2021  summarized below:  59 y.o. female presents to the clinic, referred by her PCP at [facility] and [address] [place] [address], for generalized abdominal pain. Patient denies experiencing abdominal pain, except when she has to move/lift things at work. States her PCP diagnosed her with an abdominal hernia. Patient reports experiencing nausea (without vomiting) couple of times per week. She states nothing makes this worse or better. She denies GERD symptoms, vomiting, hematemesis, dysphagia or unintentional weight loss.    She also reports longstanding history of diarrhea that began in her early 46s. States that she used to have 6-8 liquid/loose bowel movements per day. She reports being a heavy alcohol drinker off and on since her early 25s. States she quit drinking alcohol in July of this year and since that time her bowel movements have decreased to around 3 per day. She does report her first bowel movement of the day being soft/firm. However, after she eats she proceeds to have 1-2 episodes of diarrhea per day with occasional incontinence to feces. She uses Imodium as needed when going out. States she does not eat much while at work for fear of diarrhea with fecal incontinence. She denies hematochezia, melena or constipation. She reports her weight is stable. However, she works in [employer] and reports indulging on sweets since she quit drinking alcohol. She is a former smoker, history of EtOH abuse (quit in July of this year), denies illicit drug use. Denies NSAID use. She does report history of uterine cancer, s/p hysterectomy and radiation therapy in 2005. She reports her PCP has ordered multiple tests including CT of the abdomen pelvis, FibroScan of her liver, CBC, CMP, amylase and lipase. States she has not completed these tests yet. However, will do so in the near future.     Previous GI work up/Endoscopic investigations:   Colonoscopy 3/24/21: Diminutive ascending colon polyp, resected and retrieved with biopsy forceps. Left-sided diverticulosis. Medium-sized external hemorrhoids. Pathology: Ascending colon polyptubular adenoma. Colonic mucosa with small lymphoid nodule. Review of Systems   Constitutional: Negative for appetite change, fatigue, fever and unexpected weight change. HENT: Negative for trouble swallowing and voice change. Eyes: Negative. Respiratory: Negative for choking. Cardiovascular: Negative. Gastrointestinal: Positive for abdominal distention, abdominal pain and nausea.  Negative for anal bleeding, blood in stool, constipation, diarrhea, rectal pain and vomiting. Endocrine: Negative. Genitourinary: Negative. Musculoskeletal: Negative. Skin: Negative. Allergic/Immunologic: Negative for food allergies. Neurological: Negative. Hematological: Negative. Psychiatric/Behavioral: Negative for agitation, decreased concentration, self-injury, sleep disturbance and suicidal ideas. The patient is not nervous/anxious. Past medical history, past surgical history, medication list, social and familyhistory reviewed    Pulse 79, height 5' 4\" (1.626 m), weight 179 lb (81.2 kg), SpO2 100 %. Physical Exam  Constitutional:       General: She is not in acute distress. Appearance: Normal appearance. She is normal weight. She is not ill-appearing. HENT:      Head: Normocephalic and atraumatic. Eyes:      General: No scleral icterus. Cardiovascular:      Rate and Rhythm: Normal rate and regular rhythm. Pulses: Normal pulses. Pulmonary:      Effort: Pulmonary effort is normal. No respiratory distress. Breath sounds: Normal breath sounds. Abdominal:      General: Bowel sounds are normal. There is no distension. Palpations: Abdomen is soft. There is no mass. Tenderness: There is no abdominal tenderness. There is no guarding or rebound. Hernia: A hernia is present. Hernia is present in the ventral area (mild tenderness, nonreducible). Comments: Central obesity   Musculoskeletal:         General: Normal range of motion. Lymphadenopathy:      Cervical: No cervical adenopathy. Skin:     General: Skin is warm and dry. Coloration: Skin is not jaundiced. Neurological:      Mental Status: She is alert and oriented to person, place, and time. Psychiatric:         Mood and Affect: Mood normal.         Behavior: Behavior normal.         Thought Content:  Thought content normal.         Judgment: Judgment normal.       Laboratory, Pathology, Radiology reviewed in detail with relevantimportant investigations summarized below:    Lab Results   Component Value Date    WBC 5.7 11/08/2021    HGB 13.3 11/08/2021    HCT 40.4 11/08/2021    MCV 87.7 11/08/2021     11/08/2021     Lab Results   Component Value Date    ALT 41 (H) 11/08/2021    AST 26 11/08/2021    ALKPHOS 73 11/08/2021    BILITOT 0.4 11/08/2021     Component      Latest Ref Rng & Units 11/10/2021          11:07 AM   Fecal pH      5.0 - 8.5 7.0     Fibrosure testing on 11/8/21: revealed portal fibrosis with possible few septa (predominance of F1 but F2 is possible). Assessment and Plan:  Bairon Bsuby 59 y.o. female with:     1. Abdominal pain, unspecified abdominal location  2. Ventral hernia without obstruction or gangrene  -Physical exam consistent with large, nonreducible ventral hernia. -Referral made to surgery, Dr. Fierro, for evaluation of hernia/possible repair. 3. Nausea  -Per patient, nausea worsens prior to bowel movement. No alarm symptoms. Will monitor. 4. Chronic diarrhea  -Currently resolved with dietary changes and cessation of alcohol use. Fecal pH within normal limits. If recurs, patient may use OTC Imodium as needed. 5. History of alcohol abuse  6. Elevated ALT measurement  7. Hepatic fibrosis  -Patient with longstanding history of alcohol abuse, she is currently abstaining from alcohol at this time. Recent serum fibrosure testing ordered by PCP consistent with some degree of hepatic fibrosis (F1, however F2 possible). Suspect multiple factors contributing to hepatic fibrosis including hepatic steatosis and history of EtOH abuse.  -Will obtain FibroScan for further classification of fibrosis. -Recommend Mediterranean-style diet and increased activity with goal of 5% weight loss in the next couple months. Return 2 weeks after fibroscan to go over results.     TOM Richardson - CNP   Staff Gastroenterology Nurse Practitioner  [facility]

## 2021-12-19 ASSESSMENT — ENCOUNTER SYMPTOMS
RECTAL PAIN: 0
DIARRHEA: 0
ABDOMINAL PAIN: 1
ANAL BLEEDING: 0
VOICE CHANGE: 0
ABDOMINAL DISTENTION: 1
BLOOD IN STOOL: 0
CHOKING: 0
CONSTIPATION: 0
TROUBLE SWALLOWING: 0
VOMITING: 0
NAUSEA: 1
EYES NEGATIVE: 1

## 2022-01-04 ENCOUNTER — OFFICE VISIT (OUTPATIENT)
Dept: SURGERY | Age: 62
End: 2022-01-04
Payer: MEDICARE

## 2022-01-04 VITALS
TEMPERATURE: 96.8 F | WEIGHT: 183 LBS | HEIGHT: 64 IN | HEART RATE: 67 BPM | OXYGEN SATURATION: 98 % | BODY MASS INDEX: 31.24 KG/M2

## 2022-01-04 DIAGNOSIS — K43.9 VENTRAL HERNIA WITHOUT OBSTRUCTION OR GANGRENE: Primary | ICD-10-CM

## 2022-01-04 PROCEDURE — G8427 DOCREV CUR MEDS BY ELIG CLIN: HCPCS | Performed by: COLON & RECTAL SURGERY

## 2022-01-04 PROCEDURE — 99203 OFFICE O/P NEW LOW 30 MIN: CPT | Performed by: COLON & RECTAL SURGERY

## 2022-01-04 PROCEDURE — 1036F TOBACCO NON-USER: CPT | Performed by: COLON & RECTAL SURGERY

## 2022-01-04 PROCEDURE — G8417 CALC BMI ABV UP PARAM F/U: HCPCS | Performed by: COLON & RECTAL SURGERY

## 2022-01-04 PROCEDURE — G8484 FLU IMMUNIZE NO ADMIN: HCPCS | Performed by: COLON & RECTAL SURGERY

## 2022-01-04 PROCEDURE — 3017F COLORECTAL CA SCREEN DOC REV: CPT | Performed by: COLON & RECTAL SURGERY

## 2022-01-04 ASSESSMENT — ENCOUNTER SYMPTOMS
SHORTNESS OF BREATH: 0
ABDOMINAL PAIN: 1
BACK PAIN: 0
CHEST TIGHTNESS: 0
COLOR CHANGE: 0
VOMITING: 0
CONSTIPATION: 0
DIARRHEA: 0

## 2022-01-04 NOTE — PROGRESS NOTES
Subjective:      Patient ID: Callum Hennessy is a 59 y.o. female who presents for:  Chief Complaint   Patient presents with   [facility] Patient       This is a 57-year-old female who was referred for an abdominal wall hernia. She has had this bulge present for a number of years but cannot tell me how long she thinks its been there. She had a colonoscopy earlier this year with a benign polyp and diverticular disease noted. She denies weight loss or any family history of colon disease including cancer. Past Medical History:   Diagnosis Date    Essential hypertension     Obesity     Tobacco dependence      Past Surgical History:   Procedure Laterality Date    COLONOSCOPY N/A 3/24/2021    COLORECTAL CANCER SCREENING, NOT HIGH RISK performed by Gino Edge MD at [address] [place] [address] History     Socioeconomic History    Marital status: Single     Spouse name: Not on file    Number of children: Not on file    Years of education: Not on file    Highest education level: Not on file   Occupational History    Not on file   Tobacco Use    Smoking status: Former Smoker     Packs/day: 0.50     Types: Cigarettes     Quit date: 3/24/2009     Years since quittin.7    Smokeless tobacco: Never Used   Vaping Use    Vaping Use: Every day    Substances: Nicotine    Devices: Pre-filled or refillable cartridge   Substance and Sexual Activity    Alcohol use:  Yes     Alcohol/week: 4.0 standard drinks     Types: 4 Cans of beer per week     Comment: weekly    Drug use: Never    Sexual activity: Not on file   Other Topics Concern    Not on file   Social History Narrative    Not on file     Social Determinants of Health     Financial Resource Strain:     Difficulty of Paying Living Expenses: Not on file   Food Insecurity:     Worried About Running Out of Food in the Last Year: Not on file    Brittany of Food in the Last Year: Not on file   Transportation Needs:  Lack of Transportation (Medical): Not on file    Lack of Transportation (Non-Medical): Not on file   Physical Activity:     Days of Exercise per Week: Not on file    Minutes of Exercise per Session: Not on file   Stress:     Feeling of Stress : Not on file   Social Connections:     Frequency of Communication with Friends and Family: Not on file    Frequency of Social Gatherings with Friends and Family: Not on file    Attends Pentec[employer]alism Services: Not on file    Active Member of [address] [place] [address] or Organizations: Not on file    Attends Club or Organization Meetings: Not on file    Marital Status: Not on file   Intimate Partner Violence:     Fear of Current or Ex-Partner: Not on file    Emotionally Abused: Not on file    Physically Abused: Not on file    Sexually Abused: Not on file   Housing Stability:     Unable to Pay for Housing in the Last Year: Not on file    Number of [place] in the Last Year: Not on file    Unstable Housing in the Last Year: Not on file     Family History   Problem Relation Age of Onset    Colon Cancer Paternal Grandfather      Allergies:  Latex and Lisinopril    Review of Systems   Constitutional: Negative for activity change, appetite change and unexpected weight change. HENT: Negative for congestion. Respiratory: Negative for chest tightness and shortness of breath. Cardiovascular: Negative for chest pain. Gastrointestinal: Positive for abdominal pain. Negative for constipation, diarrhea and vomiting. Pain described over the hernia site on activity   Genitourinary: Negative for difficulty urinating. Musculoskeletal: Negative for back pain. Skin: Negative for color change. Neurological: Negative for dizziness and headaches. Hematological: Does not bruise/bleed easily. Psychiatric/Behavioral: Negative for agitation.        Objective:    Pulse 67   Temp 96.8 °F (36 °C) (Temporal)   Ht 5' 4\" (1.626 m)   Wt 183 lb (83 kg)   SpO2 98%   BMI 31.41 kg/m² Physical Exam  Constitutional:       Appearance: She is well-developed. HENT:      Head: Normocephalic and atraumatic. Eyes:      Pupils: Pupils are equal, round, and reactive to light. Cardiovascular:      Rate and Rhythm: Normal rate and regular rhythm. Heart sounds: Normal heart sounds. Pulmonary:      Effort: Pulmonary effort is normal. No respiratory distress. Breath sounds: Normal breath sounds. No wheezing. Abdominal:      General: There is no distension. Palpations: There is no mass. Tenderness: There is no abdominal tenderness. There is no guarding or rebound. Hernia: A hernia is present. Comments: She has an incarcerated hernia right of the umbilicus not associated with any incision. No skin changes are present. Non reducible. Suspect omental incarceration present   Musculoskeletal:         General: Normal range of motion. Cervical back: Normal range of motion and neck supple. Skin:     General: Skin is warm and dry. Coloration: Skin is not pale. Findings: No erythema or rash. Neurological:      Mental Status: She is alert and oriented to person, place, and time. Psychiatric:         Behavior: Behavior normal.         Judgment: Judgment normal.              Assessment/Plan:          Diagnosis Orders   1. Ventral hernia without obstruction or gangrene       With anatomic diagrams, I described the risks and benefits of ventral hernia repair with mesh. Risks of the procedure including infection, bleeding, damage to the underlying intestine recurrence reoperation and postoperative pain were all addressed. Nonoperative alternatives were given. Despite the risks, she wishes to proceed with ventral hernia repair with mesh. Consent obtained.             Please note this report has beenpartially produced using speech recognition software and may cause contain errors related to that system including grammar, punctuation and spelling as well as words and phrases that may seem inappropriate.  If there arequestions or concerns please feel free to contact me to clarify

## 2022-01-07 ENCOUNTER — TELEPHONE (OUTPATIENT)
Dept: GASTROENTEROLOGY | Age: 62
End: 2022-01-07

## 2022-01-07 ENCOUNTER — HOSPITAL ENCOUNTER (OUTPATIENT)
Dept: PREADMISSION TESTING | Age: 62
Discharge: HOME | End: 2022-01-11
Payer: MEDICARE

## 2022-01-07 VITALS
BODY MASS INDEX: 32.07 KG/M2 | HEART RATE: 50 BPM | RESPIRATION RATE: 16 BRPM | HEIGHT: 63 IN | WEIGHT: 181 LBS | SYSTOLIC BLOOD PRESSURE: 154 MMHG | OXYGEN SATURATION: 98 % | TEMPERATURE: 96.8 F | DIASTOLIC BLOOD PRESSURE: 89 MMHG

## 2022-01-07 LAB
ANION GAP SERPL CALCULATED.3IONS-SCNC: 10 MEQ/L (ref 9–15)
BUN BLDV-MCNC: 15 MG/DL (ref 8–23)
CALCIUM SERPL-MCNC: 9.7 MG/DL (ref 8.5–9.9)
CHLORIDE BLD-SCNC: 99 MEQ/L (ref 95–107)
CO2: 28 MEQ/L (ref 20–31)
CREAT SERPL-MCNC: 0.78 MG/DL (ref 0.5–0.9)
EKG ATRIAL RATE: 53 BPM
EKG P AXIS: 3 DEGREES
EKG P-R INTERVAL: 112 MS
EKG Q-T INTERVAL: 450 MS
EKG QRS DURATION: 70 MS
EKG QTC CALCULATION (BAZETT): 422 MS
EKG R AXIS: 10 DEGREES
EKG T AXIS: 27 DEGREES
EKG VENTRICULAR RATE: 53 BPM
GFR AFRICAN AMERICAN: >60
GFR NON-AFRICAN AMERICAN: >60
GLUCOSE BLD-MCNC: 108 MG/DL (ref 70–99)
HCT VFR BLD CALC: 39 % (ref 37–47)
HEMOGLOBIN: 13.2 G/DL (ref 12–16)
MCH RBC QN AUTO: 29.6 PG (ref 27–31.3)
MCHC RBC AUTO-ENTMCNC: 33.9 % (ref 33–37)
MCV RBC AUTO: 87.3 FL (ref 82–100)
PDW BLD-RTO: 13.6 % (ref 11.5–14.5)
PLATELET # BLD: 209 K/UL (ref 130–400)
POTASSIUM SERPL-SCNC: 4.6 MEQ/L (ref 3.4–4.9)
RBC # BLD: 4.47 M/UL (ref 4.2–5.4)
SARS-COV-2, PCR: NOT DETECTED
SODIUM BLD-SCNC: 137 MEQ/L (ref 135–144)
WBC # BLD: 8.2 K/UL (ref 4.8–10.8)

## 2022-01-07 PROCEDURE — 93010 ELECTROCARDIOGRAM REPORT: CPT | Performed by: INTERNAL MEDICINE

## 2022-01-07 PROCEDURE — 93005 ELECTROCARDIOGRAM TRACING: CPT | Performed by: COLON & RECTAL SURGERY

## 2022-01-07 PROCEDURE — 85027 COMPLETE CBC AUTOMATED: CPT

## 2022-01-07 PROCEDURE — 87635 SARS-COV-2 COVID-19 AMP PRB: CPT

## 2022-01-07 PROCEDURE — 80048 BASIC METABOLIC PNL TOTAL CA: CPT

## 2022-01-07 RX ORDER — LIDOCAINE HYDROCHLORIDE 10 MG/ML
1 INJECTION, SOLUTION EPIDURAL; INFILTRATION; INTRACAUDAL; PERINEURAL
Status: CANCELLED | OUTPATIENT
Start: 2022-01-07 | End: 2022-01-07

## 2022-01-07 RX ORDER — SODIUM CHLORIDE 0.9 % (FLUSH) 0.9 %
10 SYRINGE (ML) INJECTION EVERY 12 HOURS SCHEDULED
Status: CANCELLED | OUTPATIENT
Start: 2022-01-07

## 2022-01-07 RX ORDER — SODIUM CHLORIDE 9 MG/ML
25 INJECTION, SOLUTION INTRAVENOUS PRN
Status: CANCELLED | OUTPATIENT
Start: 2022-01-07

## 2022-01-07 RX ORDER — SODIUM CHLORIDE 0.9 % (FLUSH) 0.9 %
10 SYRINGE (ML) INJECTION PRN
Status: CANCELLED | OUTPATIENT
Start: 2022-01-07

## 2022-01-07 RX ORDER — SODIUM CHLORIDE, SODIUM LACTATE, POTASSIUM CHLORIDE, CALCIUM CHLORIDE 600; 310; 30; 20 MG/100ML; MG/100ML; MG/100ML; MG/100ML
INJECTION, SOLUTION INTRAVENOUS CONTINUOUS
Status: CANCELLED | OUTPATIENT
Start: 2022-01-07

## 2022-01-07 NOTE — PROGRESS NOTES
Patient states her plans for discharge is Alton Gavin or Aniya Crum and she will get a female . Explained to patient,that is not our policy , she needs a family or friend to drive home after being sedated, it is a liability issue for the hospital.  Spoke with Bucky Baldwin, and Dr. Ernie Naranjo office called was called and spoke with [place], if patient does not have a  home, OR will be cancelled.

## 2022-01-27 ENCOUNTER — ANCILLARY PROCEDURE (OUTPATIENT)
Dept: ENDOSCOPY | Age: 62
End: 2022-01-27
Payer: MEDICARE

## 2022-01-27 DIAGNOSIS — R74.01 ELEVATED ALT MEASUREMENT: ICD-10-CM

## 2022-01-27 PROCEDURE — 91200 LIVER ELASTOGRAPHY: CPT

## 2022-01-27 PROCEDURE — 91200 LIVER ELASTOGRAPHY: CPT | Performed by: INTERNAL MEDICINE

## 2024-01-18 ENCOUNTER — OFFICE VISIT (OUTPATIENT)
Dept: SURGERY | Facility: CLINIC | Age: 64
End: 2024-01-18
Payer: COMMERCIAL

## 2024-01-18 ENCOUNTER — LAB (OUTPATIENT)
Dept: LAB | Facility: LAB | Age: 64
End: 2024-01-18
Payer: COMMERCIAL

## 2024-01-18 VITALS
DIASTOLIC BLOOD PRESSURE: 86 MMHG | HEART RATE: 61 BPM | BODY MASS INDEX: 28.17 KG/M2 | WEIGHT: 165 LBS | SYSTOLIC BLOOD PRESSURE: 136 MMHG | HEIGHT: 64 IN

## 2024-01-18 DIAGNOSIS — K43.9 VENTRAL HERNIA WITHOUT OBSTRUCTION OR GANGRENE: Primary | ICD-10-CM

## 2024-01-18 DIAGNOSIS — K43.9 VENTRAL HERNIA WITHOUT OBSTRUCTION OR GANGRENE: ICD-10-CM

## 2024-01-18 LAB
CREAT SERPL-MCNC: 1.15 MG/DL (ref 0.5–1.05)
EGFRCR SERPLBLD CKD-EPI 2021: 53 ML/MIN/1.73M*2

## 2024-01-18 PROCEDURE — 36415 COLL VENOUS BLD VENIPUNCTURE: CPT

## 2024-01-18 PROCEDURE — 82565 ASSAY OF CREATININE: CPT

## 2024-01-18 PROCEDURE — 99203 OFFICE O/P NEW LOW 30 MIN: CPT | Performed by: SURGERY

## 2024-01-18 PROCEDURE — 1159F MED LIST DOCD IN RCRD: CPT | Performed by: SURGERY

## 2024-01-18 PROCEDURE — 1036F TOBACCO NON-USER: CPT | Performed by: SURGERY

## 2024-01-18 RX ORDER — PAROXETINE 30 MG/1
30 TABLET, FILM COATED ORAL
COMMUNITY

## 2024-01-18 RX ORDER — OXYBUTYNIN CHLORIDE 5 MG/1
5 TABLET, EXTENDED RELEASE ORAL DAILY
COMMUNITY
Start: 2023-12-20

## 2024-01-18 ASSESSMENT — ENCOUNTER SYMPTOMS
ABDOMINAL PAIN: 1
ABDOMINAL DISTENTION: 1

## 2024-01-18 NOTE — PROGRESS NOTES
Subjective   Patient ID: Martir Granger is a 63 y.o. female who presents for New Patient Visit (Ventral hernia consult. ).  HPI  63-year-old female referred for moderately symptomatic abdominal wall bulge to the right of the umbilicus no obstructive symptoms.  She is status post hysterectomy with BSO in 2005 in [place] according to the patient.  This was done for uterine CA.  She states that postoperatively she had prolonged drainage no previous repairs.  She states she vapes  Review of Systems   Gastrointestinal:  Positive for abdominal distention and abdominal pain.   All other systems reviewed and are negative.      Objective   Physical Exam  Abdominal:      Comments: Abdomen soft chronic Raphael incarcerated nontender hernia to the right of the umbilicus approximately 5 x 5 cm although the edges are hard to define     Physical Exam  Constitutional:       Appearance: Normal appearance.   HENT:      Head: Normocephalic and atraumatic.      Mouth/Throat:      Pharynx: Oropharynx is clear.   Eyes:      Pupils: Pupils are equal, round, and reactive to light.   Cardiovascular:      Rate and Rhythm: Normal rate and regular rhythm.   Pulmonary:      Effort: Pulmonary effort is normal.      Breath sounds: Normal breath sounds.   Abdominal:      General: Abdomen is flat. Bowel sounds are normal.      Palpations: Abdomen is soft.   Musculoskeletal:         General: Normal range of motion.      Cervical back: Normal range of motion.   Skin:     General: Skin is warm.   Neurological:      General: No focal deficit present.      Mental Status: She is alert. Mental status is at baseline.   Psychiatric:         Mood and Affect: Mood normal.       Assessment/Plan   Ventral hernia with symptoms nonobstructive discussed with the patient she is willing to stop vaping prior to surgery we will proceed with CT abdomen pelvis and see back after that and discuss surgery         Avel Scanlon MD 01/18/24 1:34 PM

## 2024-02-01 ENCOUNTER — HOSPITAL ENCOUNTER (OUTPATIENT)
Dept: RADIOLOGY | Facility: HOSPITAL | Age: 64
Discharge: HOME | End: 2024-02-01
Payer: COMMERCIAL

## 2024-02-01 DIAGNOSIS — K43.9 VENTRAL HERNIA WITHOUT OBSTRUCTION OR GANGRENE: ICD-10-CM

## 2024-02-01 PROCEDURE — 2550000001 HC RX 255 CONTRASTS: Performed by: SURGERY

## 2024-02-01 PROCEDURE — 74177 CT ABD & PELVIS W/CONTRAST: CPT

## 2024-02-01 PROCEDURE — 74177 CT ABD & PELVIS W/CONTRAST: CPT | Performed by: STUDENT IN AN ORGANIZED HEALTH CARE EDUCATION/TRAINING PROGRAM

## 2024-02-01 RX ADMIN — IOHEXOL 75 ML: 350 INJECTION, SOLUTION INTRAVENOUS at 08:57

## 2024-02-07 ENCOUNTER — OFFICE VISIT (OUTPATIENT)
Dept: SURGERY | Facility: CLINIC | Age: 64
End: 2024-02-07
Payer: COMMERCIAL

## 2024-02-07 VITALS
BODY MASS INDEX: 28.17 KG/M2 | HEIGHT: 64 IN | DIASTOLIC BLOOD PRESSURE: 86 MMHG | WEIGHT: 165 LBS | SYSTOLIC BLOOD PRESSURE: 136 MMHG

## 2024-02-07 DIAGNOSIS — K43.9 VENTRAL HERNIA WITHOUT OBSTRUCTION OR GANGRENE: Primary | ICD-10-CM

## 2024-02-07 PROCEDURE — 1159F MED LIST DOCD IN RCRD: CPT | Performed by: SURGERY

## 2024-02-07 PROCEDURE — 1036F TOBACCO NON-USER: CPT | Performed by: SURGERY

## 2024-02-07 PROCEDURE — 99213 OFFICE O/P EST LOW 20 MIN: CPT | Performed by: SURGERY

## 2024-02-07 ASSESSMENT — ENCOUNTER SYMPTOMS
ABDOMINAL DISTENTION: 1
ABDOMINAL PAIN: 1

## 2024-02-07 NOTE — PROGRESS NOTES
Subjective   Patient ID: Martir Granger is a 63 y.o. female who presents for Follow-up (Follow up for CT results. ).  HPI  Patient returns after CAT scan continues to have the bulge right side no pain except with pressure patient continues to lose weight and improving her health overall  Review of Systems   Gastrointestinal:  Positive for abdominal distention and abdominal pain.   All other systems reviewed and are negative.      Objective   Physical Exam  Physical Exam  Constitutional:       Appearance: Normal appearance.   HENT:      Head: Normocephalic and atraumatic.      Mouth/Throat:      Pharynx: Oropharynx is clear.   Eyes:      Pupils: Pupils are equal, round, and reactive to light.   Cardiovascular:      Rate and Rhythm: Normal rate and regular rhythm.   Pulmonary:      Effort: Pulmonary effort is normal.      Breath sounds: Normal breath sounds.   Abdominal:      as before partially reducible ventral hernia minimally tender     musculoskeletal:         General: Normal range of motion.      Cervical back: Normal range of motion.   Skin:     General: Skin is warm.   Neurological:      General: No focal deficit present.      Mental Status: She is alert. Mental status is at baseline.   Psychiatric:         Mood and Affect: Mood normal.     Assessment/Plan   CT abdomen pelvis w IV contrast    Result Date: 2/1/2024  Interpreted By:  Marianna Kat, STUDY: CT ABDOMEN PELVIS W IV CONTRAST;  2/1/2024 9:01 am   INDICATION: Signs/Symptoms:Ventral hernia.   COMPARISON: None   ACCESSION NUMBER(S): [id number]   ORDERING CLINICIAN: MENDOZA PINEDA   TECHNIQUE: Axial CT of the abdomen and pelvis was performed following intravenous administration of 75 ml of contrast Omnipaque 350 with coronal and sagittal reconstruction.   FINDINGS: Lower chest: No focal consolidation or pleural effusion.   Liver: Multiple low-attenuation lesions, most of which are subcentimeter with the exception of one dominant mass in segment 3  measuring 2.9 cm (series 201, image 49).   Biliary: No intrahepatic or extrahepatic bile duct dilation. No cholelithiasis.   Spleen: No mass. No splenomegaly.   Pancreas: No mass or duct dilation.   Adrenals: Bilateral nodular thickening.   Kidneys: No suspicious mass, calculus or hydronephrosis.   GI tract: No bowel wall thickening or dilation. The appendix is normal. Colonic diverticula without acute inflammation.   Lymph nodes: Scattered enlarged bilateral inguinal lymph nodes, for example 1.8 cm right inguinal node (series 201, image 143). No intra-abdominal lymphadenopathy.   Mesentery/peritoneum: No ascites, free air or fluid collection.   Vasculature: Abdominal aortic atherosclerotic calcifications without aneurysmal dilation.   Pelvis: No ascites, free air or fluid collection.   Bones/Soft tissues: No acute osseous or abdominal wall soft tissue abnormalities.Multilevel degenerative disc disease, moderate L1-L2 and L5-S1. Midline lower abdominal wall postsurgical scarring. Fat containing right paramedian supraumbilical ventral hernia, hernia neck 3.4 cm, hernia sac 8.3 x 4.1 x 7.3 cm.       Fat containing right paramedian supraumbilical ventral hernia, hernia neck 3.4 cm, hernia sac 8.3 x 4.1 x 7.3 cm.   Nonspecific hepatic lesions up to 2.9 cm in segment 3. Consider further evaluation with dedicated liver MRI.   Nonspecific bilateral inguinal lymphadenopathy. Consider follow-up in 3-6 months.   MACRO: Critical Finding:  See findings. Notification was initiated on 2/1/2024 at 5:23 pm by  Marianna Kat.  (**-YCF-**) Instructions:   Signed by: Marianna Kat 2/1/2024 5:24 PM Dictation workstation:   [id number]       CT reviewed in detail with the patient recommend observation for now as she is asymptomatic or symptomatic minimally and there is no bowel in the hernia see back in 6 months reassess her health at the time symptoms may resolve patient agreeable with plan       Avel Scanlon MD 02/07/24 12:22 PM

## 2024-07-11 ENCOUNTER — HOSPITAL ENCOUNTER (EMERGENCY)
Facility: HOSPITAL | Age: 64
Discharge: HOME | End: 2024-07-11
Payer: COMMERCIAL

## 2024-07-11 VITALS
DIASTOLIC BLOOD PRESSURE: 89 MMHG | BODY MASS INDEX: 29.02 KG/M2 | RESPIRATION RATE: 18 BRPM | HEIGHT: 64 IN | OXYGEN SATURATION: 97 % | SYSTOLIC BLOOD PRESSURE: 179 MMHG | TEMPERATURE: 98.2 F | WEIGHT: 170 LBS | HEART RATE: 63 BPM

## 2024-07-11 DIAGNOSIS — L25.5 CONTACT DERMATITIS DUE TO PLANT: Primary | ICD-10-CM

## 2024-07-11 PROBLEM — M54.9 BACK PAIN: Status: ACTIVE | Noted: 2024-07-11

## 2024-07-11 PROCEDURE — 2500000001 HC RX 250 WO HCPCS SELF ADMINISTERED DRUGS (ALT 637 FOR MEDICARE OP): Performed by: PHYSICIAN ASSISTANT

## 2024-07-11 PROCEDURE — 99283 EMERGENCY DEPT VISIT LOW MDM: CPT

## 2024-07-11 PROCEDURE — 2500000004 HC RX 250 GENERAL PHARMACY W/ HCPCS (ALT 636 FOR OP/ED): Performed by: PHYSICIAN ASSISTANT

## 2024-07-11 PROCEDURE — 96372 THER/PROPH/DIAG INJ SC/IM: CPT | Performed by: PHYSICIAN ASSISTANT

## 2024-07-11 RX ORDER — PREDNISONE 20 MG/1
TABLET ORAL
Qty: 30 TABLET | Refills: 0 | Status: SHIPPED | OUTPATIENT
Start: 2024-07-11 | End: 2024-07-25

## 2024-07-11 RX ORDER — FAMOTIDINE 20 MG/1
20 TABLET, FILM COATED ORAL ONCE
Status: COMPLETED | OUTPATIENT
Start: 2024-07-11 | End: 2024-07-11

## 2024-07-11 RX ORDER — DIPHENHYDRAMINE HCL 25 MG
50 TABLET ORAL ONCE
Status: COMPLETED | OUTPATIENT
Start: 2024-07-11 | End: 2024-07-11

## 2024-07-11 RX ADMIN — FAMOTIDINE 20 MG: 20 TABLET, FILM COATED ORAL at 09:29

## 2024-07-11 RX ADMIN — DIPHENHYDRAMINE HYDROCHLORIDE 50 MG: 25 TABLET ORAL at 09:29

## 2024-07-11 RX ADMIN — METHYLPREDNISOLONE SODIUM SUCCINATE 125 MG: 125 INJECTION, POWDER, FOR SOLUTION INTRAMUSCULAR; INTRAVENOUS at 09:29

## 2024-07-11 ASSESSMENT — LIFESTYLE VARIABLES
EVER HAD A DRINK FIRST THING IN THE MORNING TO STEADY YOUR NERVES TO GET RID OF A HANGOVER: NO
EVER FELT BAD OR GUILTY ABOUT YOUR DRINKING: NO
HAVE PEOPLE ANNOYED YOU BY CRITICIZING YOUR DRINKING: NO
HAVE YOU EVER FELT YOU SHOULD CUT DOWN ON YOUR DRINKING: NO
TOTAL SCORE: 0

## 2024-07-11 ASSESSMENT — PAIN - FUNCTIONAL ASSESSMENT: PAIN_FUNCTIONAL_ASSESSMENT: 0-10

## 2024-07-11 ASSESSMENT — PAIN SCALES - GENERAL: PAINLEVEL_OUTOF10: 0 - NO PAIN

## 2024-07-11 NOTE — Clinical Note
Martir Granger was seen and treated in our emergency department on 7/11/2024.  She may return to work on 07/13/2024.       If you have any questions or concerns, please don't hesitate to call.      Marcus Patel PA-C

## 2024-07-11 NOTE — ED PROVIDER NOTES
HPI   Chief Complaint   Patient presents with    Facial Swelling     Patient was working in yard Tuesday and she has a rash to left arm and right eye is swollen       A 63-year-old female patient comes in the emergency department today with complaints of a rash to her left arm and her right side of her face.  States this all started Tuesday evening after she did a bunch of yard work.  States she woke up Wednesday and she had the rash to her face.  States this seems to has increased in some severity.  States she took some p.o. Benadryl as well as some topical Benadryl without much relief.  Denies any associated pain with this.  States is mildly itchy.  For this purpose comes in the emergency department today further evaluation.  No complaints this present time.                          No data recorded                   Patient History   Past Medical History:   Diagnosis Date    Type 2 diabetes mellitus (Multi)      Past Surgical History:   Procedure Laterality Date    COLONOSCOPY W/ POLYPECTOMY      OTHER SURGICAL HISTORY  09/12/2022    Total abdominal laparoscopic hysterectomy and bilateral salpingo-oophorectomy    OTHER SURGICAL HISTORY  09/12/2022    Tonsillectomy     Family History   Problem Relation Name Age of Onset    Lung cancer Mother      Lung cancer Father       Social History     Tobacco Use    Smoking status: Former     Types: Cigarettes    Smokeless tobacco: Former   Vaping Use    Vaping status: Every Day    Substances: Nicotine   Substance Use Topics    Alcohol use: Yes    Drug use: Yes     Types: Marijuana       Physical Exam   ED Triage Vitals [07/11/24 0852]   Temperature Heart Rate Respirations BP   36.8 °C (98.2 °F) 63 18 179/89      Pulse Ox Temp Source Heart Rate Source Patient Position   97 % Temporal Monitor --      BP Location FiO2 (%)     -- --       Physical Exam  Constitutional:       General: She is in acute distress (Mild).      Appearance: Normal appearance. She is not ill-appearing.    HENT:      Head: Normocephalic.      Comments: Right facial swelling as well as some periorbital swelling on the right with associated rash consistent with a contact dermatitis secondary to plants     Nose: Nose normal.   Eyes:      Extraocular Movements: Extraocular movements intact.      Conjunctiva/sclera: Conjunctivae normal.   Cardiovascular:      Rate and Rhythm: Normal rate and regular rhythm.   Pulmonary:      Effort: Pulmonary effort is normal. No respiratory distress.      Breath sounds: Normal breath sounds. No stridor. No wheezing.   Musculoskeletal:         General: Normal range of motion.      Cervical back: Normal range of motion.   Skin:     General: Skin is warm and dry.      Comments: Rash left arm consistent with contact dermatitis secondary to plants   Neurological:      General: No focal deficit present.      Mental Status: She is alert and oriented to person, place, and time. Mental status is at baseline.   Psychiatric:         Mood and Affect: Mood normal.         ED Course & MDM   Diagnoses as of 07/11/24 0913   Contact dermatitis due to plant       Medical Decision Making  A 63-year-old female patient comes in the emergency department today with complaints of a rash to her left arm and her right side of her face.  States this all started Tuesday evening after she did a bunch of yard work.  States she woke up Wednesday and she had the rash to her face.  States this seems to has increased in some severity.  States she took some p.o. Benadryl as well as some topical Benadryl without much relief.  Denies any associated pain with this.  States is mildly itchy.  For this purpose comes in the emergency department today further evaluation.  No complaints this present time.    Patient given IM Solu-Medrol p.o. Benadryl, p.o. Pepcid here in the emergency department.    With his contact dermatitis secondary to plants.  Will place patient on a long tapering prednisone dose.  Patient agrees with this  plan expressed full verbal understanding.  All questions answered.    Patient denies any vision changes or eye pain    Historians the patient    Diagnosis: Contact dermatitis secondary to plant        Procedure  Procedures     Marcus Patel PA-C  07/11/24 0925

## 2024-07-17 ENCOUNTER — APPOINTMENT (OUTPATIENT)
Dept: PRIMARY CARE | Facility: CLINIC | Age: 64
End: 2024-07-17
Payer: COMMERCIAL

## 2024-07-17 VITALS
HEIGHT: 63 IN | BODY MASS INDEX: 31.01 KG/M2 | SYSTOLIC BLOOD PRESSURE: 160 MMHG | WEIGHT: 175 LBS | DIASTOLIC BLOOD PRESSURE: 90 MMHG | TEMPERATURE: 96.8 F | HEART RATE: 68 BPM

## 2024-07-17 DIAGNOSIS — R59.0 INGUINAL ADENOPATHY: ICD-10-CM

## 2024-07-17 DIAGNOSIS — L25.5 CONTACT DERMATITIS DUE TO PLANT: ICD-10-CM

## 2024-07-17 DIAGNOSIS — Z13.29 THYROID DISORDER SCREEN: ICD-10-CM

## 2024-07-17 DIAGNOSIS — R73.9 HYPERGLYCEMIA: ICD-10-CM

## 2024-07-17 DIAGNOSIS — N18.31 STAGE 3A CHRONIC KIDNEY DISEASE (MULTI): Primary | ICD-10-CM

## 2024-07-17 DIAGNOSIS — I10 PRIMARY HYPERTENSION: ICD-10-CM

## 2024-07-17 DIAGNOSIS — Z00.00 ROUTINE GENERAL MEDICAL EXAMINATION AT HEALTH CARE FACILITY: ICD-10-CM

## 2024-07-17 DIAGNOSIS — Z13.6 SCREENING FOR CARDIOVASCULAR CONDITION: ICD-10-CM

## 2024-07-17 DIAGNOSIS — C54.1 ENDOMETRIAL CANCER (MULTI): ICD-10-CM

## 2024-07-17 DIAGNOSIS — E66.09 CLASS 1 OBESITY DUE TO EXCESS CALORIES WITH SERIOUS COMORBIDITY AND BODY MASS INDEX (BMI) OF 31.0 TO 31.9 IN ADULT: ICD-10-CM

## 2024-07-17 DIAGNOSIS — L23.7 POISON IVY DERMATITIS: ICD-10-CM

## 2024-07-17 DIAGNOSIS — K76.9 HEPATIC LESION: ICD-10-CM

## 2024-07-17 DIAGNOSIS — Z12.31 ENCOUNTER FOR SCREENING MAMMOGRAM FOR BREAST CANCER: ICD-10-CM

## 2024-07-17 PROCEDURE — 1123F ACP DISCUSS/DSCN MKR DOCD: CPT | Performed by: INTERNAL MEDICINE

## 2024-07-17 PROCEDURE — 1160F RVW MEDS BY RX/DR IN RCRD: CPT | Performed by: INTERNAL MEDICINE

## 2024-07-17 PROCEDURE — 1170F FXNL STATUS ASSESSED: CPT | Performed by: INTERNAL MEDICINE

## 2024-07-17 PROCEDURE — 3008F BODY MASS INDEX DOCD: CPT | Performed by: INTERNAL MEDICINE

## 2024-07-17 PROCEDURE — 1159F MED LIST DOCD IN RCRD: CPT | Performed by: INTERNAL MEDICINE

## 2024-07-17 PROCEDURE — 1036F TOBACCO NON-USER: CPT | Performed by: INTERNAL MEDICINE

## 2024-07-17 PROCEDURE — G0402 INITIAL PREVENTIVE EXAM: HCPCS | Performed by: INTERNAL MEDICINE

## 2024-07-17 PROCEDURE — 3077F SYST BP >= 140 MM HG: CPT | Performed by: INTERNAL MEDICINE

## 2024-07-17 PROCEDURE — 3080F DIAST BP >= 90 MM HG: CPT | Performed by: INTERNAL MEDICINE

## 2024-07-17 RX ORDER — TRIAMCINOLONE ACETONIDE 1 MG/G
OINTMENT TOPICAL 2 TIMES DAILY PRN
Qty: 60 G | Refills: 0 | Status: SHIPPED | OUTPATIENT
Start: 2024-07-17 | End: 2025-07-17

## 2024-07-17 RX ORDER — AMLODIPINE BESYLATE 5 MG/1
5 TABLET ORAL DAILY
Qty: 30 TABLET | Refills: 5 | Status: SHIPPED | OUTPATIENT
Start: 2024-07-17 | End: 2025-01-13

## 2024-07-17 ASSESSMENT — ENCOUNTER SYMPTOMS
CONSTITUTIONAL NEGATIVE: 1
GASTROINTESTINAL NEGATIVE: 1
HEMATOLOGIC/LYMPHATIC NEGATIVE: 1
MUSCULOSKELETAL NEGATIVE: 1
LOSS OF SENSATION IN FEET: 0
NEUROLOGICAL NEGATIVE: 1
CARDIOVASCULAR NEGATIVE: 1
FATIGUE: 0
DEPRESSION: 0
RESPIRATORY NEGATIVE: 1
OCCASIONAL FEELINGS OF UNSTEADINESS: 0
NERVOUS/ANXIOUS: 0

## 2024-07-17 ASSESSMENT — PATIENT HEALTH QUESTIONNAIRE - PHQ9
2. FEELING DOWN, DEPRESSED OR HOPELESS: NOT AT ALL
SUM OF ALL RESPONSES TO PHQ9 QUESTIONS 1 AND 2: 0
SUM OF ALL RESPONSES TO PHQ9 QUESTIONS 1 AND 2: 0
1. LITTLE INTEREST OR PLEASURE IN DOING THINGS: NOT AT ALL
1. LITTLE INTEREST OR PLEASURE IN DOING THINGS: NOT AT ALL
2. FEELING DOWN, DEPRESSED OR HOPELESS: NOT AT ALL

## 2024-07-17 ASSESSMENT — ACTIVITIES OF DAILY LIVING (ADL)
GROOMING: INDEPENDENT
GROCERY SHOPPING: INDEPENDENT
DOING HOUSEWORK: INDEPENDENT
GROCERY_SHOPPING: INDEPENDENT
HEARING - RIGHT EAR: FUNCTIONAL
HEARING - LEFT EAR: FUNCTIONAL
USING TELEPHONE: INDEPENDENT
NEEDS ASSISTANCE WITH FOOD: INDEPENDENT
STIL DRIVING: YES
BATHING: INDEPENDENT
BATHING: INDEPENDENT
ADEQUATE_TO_COMPLETE_ADL: YES
TAKING MEDICATION: INDEPENDENT
DRESSING YOURSELF: INDEPENDENT
USING TRANSPORTATION: INDEPENDENT
WALKS IN HOME: INDEPENDENT
DRESSING: INDEPENDENT
EATING: INDEPENDENT
MANAGING_FINANCES: INDEPENDENT
TAKING_MEDICATION: INDEPENDENT
DOING_HOUSEWORK: INDEPENDENT
TOILETING: INDEPENDENT
PILL BOX USED: NO
JUDGMENT_ADEQUATE_SAFELY_COMPLETE_DAILY_ACTIVITIES: YES
PATIENT'S MEMORY ADEQUATE TO SAFELY COMPLETE DAILY ACTIVITIES?: YES
MANAGING FINANCES: INDEPENDENT
PREPARING MEALS: INDEPENDENT
FEEDING YOURSELF: INDEPENDENT

## 2024-07-17 ASSESSMENT — COLUMBIA-SUICIDE SEVERITY RATING SCALE - C-SSRS
2. HAVE YOU ACTUALLY HAD ANY THOUGHTS OF KILLING YOURSELF?: NO
1. IN THE PAST MONTH, HAVE YOU WISHED YOU WERE DEAD OR WISHED YOU COULD GO TO SLEEP AND NOT WAKE UP?: NO

## 2024-07-17 NOTE — PROGRESS NOTES
Subjective   Reason for Visit: Martir Granger is an 63 y.o. female here for a Medicare Wellness visit.     Past Medical, Surgical, and Family History reviewed and updated in chart.    Reviewed all medications by prescribing practitioner or clinical pharmacist (such as prescriptions, OTCs, herbal therapies and supplements) and documented in the medical record.    63-year-old female patient came here as a new patient and we perform welcome Medicare exam.  She works full-time in a bakery, from the previous history it seems like she has endometrial carcinoma she is in remission.  She has a CT scan done in the past for a large ventral hernia which is still visible and at that time there was a spot in the liver 2.5 cm which needs to be assessed, there is a inguinal lymph nodes which were supposed to be assessed.  Patient used to have a history of hypertension and she used to take lisinopril which gave her a cough when she was taking losartan and she was not sure why it was taken off but she says that her blood pressure readings are stable at home but in the office setting they are high.  Patient has a history of depression she used to be on Paxil but she has taken herself off, she lives by herself, she takes care of herself, she has a stepfather in the case of emergency situation.  She has borderline blood sugars in the past seems like, she also has a FibroScan done for fatty liver diagnosis perhaps, she has a colonoscopy done in the past, she has not have any mammography done, recently she was in the emergency room for poison ivy dermatitis she is on prednisone still there is a diffuse rash on the limbs.        Patient Care Team:  No Assigned Pcp Generic Provider, MD as PCP - General (General Practice)     Review of Systems   Constitutional: Negative.  Negative for fatigue.   Respiratory: Negative.     Cardiovascular: Negative.    Gastrointestinal: Negative.    Musculoskeletal: Negative.    Skin:  Positive for rash.  "  Neurological: Negative.    Hematological: Negative.    Psychiatric/Behavioral:  The patient is not nervous/anxious.        Objective   Vitals:  Temp 36 °C (96.8 °F) (Temporal)   Ht 1.594 m (5' 2.75\")   Wt 79.4 kg (175 lb)   BMI 31.25 kg/m²       Physical Exam  Constitutional:       Appearance: Normal appearance. She is obese.   HENT:      Head: Normocephalic.   Eyes:      General:         Right eye: Right eye discharge: triamcinol.      Conjunctiva/sclera: Conjunctivae normal.   Cardiovascular:      Rate and Rhythm: Normal rate and regular rhythm.      Pulses: Normal pulses.      Heart sounds: Normal heart sounds.   Pulmonary:      Effort: Pulmonary effort is normal.      Breath sounds: Normal breath sounds.   Abdominal:      Palpations: Abdomen is soft.      Hernia: A hernia is present.   Musculoskeletal:         General: No swelling or tenderness.      Cervical back: Neck supple.   Skin:     General: Skin is warm and dry.      Findings: Rash present.   Neurological:      General: No focal deficit present.      Mental Status: She is oriented to person, place, and time. Mental status is at baseline.   Psychiatric:         Mood and Affect: Mood normal.         Judgment: Judgment normal.       Assessment/Plan   Problem List Items Addressed This Visit       Endometrial cancer (Multi)    Stage 3a chronic kidney disease (Multi) - Primary     Other Visit Diagnoses       Contact dermatitis due to plant        Class 1 obesity due to excess calories with serious comorbidity and body mass index (BMI) of 31.0 to 31.9 in adult        Screening for cardiovascular condition        Relevant Orders    CT cardiac scoring wo IV contrast    Encounter for screening mammogram for breast cancer        Relevant Orders    BI mammo bilateral screening tomosynthesis    Routine general medical examination at health care facility            Patient was evaluated, she is a heavyset female patient, as far as contact dermatitis is concerned " finish prednisone and topical triamcinolone was given, GFR is 53 which is reflecting stage IIIa CKD explained to her, cancer remains in remission, she will require coronary calcium score scan, mammography, she will require hemoglobin A1c, start amlodipine 5 mg BP readings are high, several other laboratories were ordered.  Her ventral hernia will be attended by surgical service.  Ultrasound of liver will be done, ultrasound of inguinal region will be done to assess this lymph nodes, depends upon the test and investigations she will be notified further, TSH will be done, monitor BP at home, keep salt restriction not more than 2.5 g a day.  Hernia is not incarcerated.  All other reports and information were reviewed and relayed including previous colonoscopy, colonoscopy will be done in 5 years from the last 1.  ADLs and IADLs are intact, welcome to Medicare exam related questions were asked and addressed, follow-up in 3 months.

## 2024-07-22 DIAGNOSIS — L23.7 POISON IVY DERMATITIS: ICD-10-CM

## 2024-07-22 RX ORDER — TRIAMCINOLONE ACETONIDE 1 MG/G
OINTMENT TOPICAL 2 TIMES DAILY PRN
Qty: 60 G | Refills: 0 | Status: SHIPPED | OUTPATIENT
Start: 2024-07-22 | End: 2025-07-22

## 2024-07-24 ENCOUNTER — APPOINTMENT (OUTPATIENT)
Dept: RADIOLOGY | Facility: HOSPITAL | Age: 64
End: 2024-07-24
Payer: COMMERCIAL

## 2024-07-24 ENCOUNTER — HOSPITAL ENCOUNTER (OUTPATIENT)
Dept: RADIOLOGY | Facility: HOSPITAL | Age: 64
Discharge: HOME | End: 2024-07-24
Payer: COMMERCIAL

## 2024-07-24 DIAGNOSIS — Z12.31 ENCOUNTER FOR SCREENING MAMMOGRAM FOR BREAST CANCER: ICD-10-CM

## 2024-07-24 PROCEDURE — 77067 SCR MAMMO BI INCL CAD: CPT

## 2024-07-25 ENCOUNTER — HOSPITAL ENCOUNTER (OUTPATIENT)
Dept: RADIOLOGY | Facility: HOSPITAL | Age: 64
Discharge: HOME | End: 2024-07-25
Payer: COMMERCIAL

## 2024-07-25 ENCOUNTER — LAB (OUTPATIENT)
Dept: LAB | Facility: LAB | Age: 64
End: 2024-07-25
Payer: COMMERCIAL

## 2024-07-25 DIAGNOSIS — Z13.29 THYROID DISORDER SCREEN: ICD-10-CM

## 2024-07-25 DIAGNOSIS — R73.9 HYPERGLYCEMIA: ICD-10-CM

## 2024-07-25 DIAGNOSIS — K76.9 HEPATIC LESION: ICD-10-CM

## 2024-07-25 DIAGNOSIS — Z00.00 ROUTINE GENERAL MEDICAL EXAMINATION AT HEALTH CARE FACILITY: ICD-10-CM

## 2024-07-25 DIAGNOSIS — N18.31 STAGE 3A CHRONIC KIDNEY DISEASE (MULTI): ICD-10-CM

## 2024-07-25 DIAGNOSIS — I10 PRIMARY HYPERTENSION: ICD-10-CM

## 2024-07-25 LAB
ALBUMIN SERPL BCP-MCNC: 4.4 G/DL (ref 3.4–5)
ALP SERPL-CCNC: 72 U/L (ref 33–136)
ALT SERPL W P-5'-P-CCNC: 23 U/L (ref 7–45)
ANION GAP SERPL CALC-SCNC: 14 MMOL/L (ref 10–20)
AST SERPL W P-5'-P-CCNC: 13 U/L (ref 9–39)
BASOPHILS # BLD AUTO: 0.03 X10*3/UL (ref 0–0.1)
BASOPHILS NFR BLD AUTO: 0.2 %
BILIRUB SERPL-MCNC: 0.4 MG/DL (ref 0–1.2)
BUN SERPL-MCNC: 42 MG/DL (ref 6–23)
CALCIUM SERPL-MCNC: 9.6 MG/DL (ref 8.6–10.3)
CHLORIDE SERPL-SCNC: 101 MMOL/L (ref 98–107)
CHOLEST SERPL-MCNC: 273 MG/DL (ref 0–199)
CHOLESTEROL/HDL RATIO: 3.1
CO2 SERPL-SCNC: 25 MMOL/L (ref 21–32)
CREAT SERPL-MCNC: 0.7 MG/DL (ref 0.5–1.05)
EGFRCR SERPLBLD CKD-EPI 2021: >90 ML/MIN/1.73M*2
EOSINOPHIL # BLD AUTO: 0.02 X10*3/UL (ref 0–0.7)
EOSINOPHIL NFR BLD AUTO: 0.2 %
ERYTHROCYTE [DISTWIDTH] IN BLOOD BY AUTOMATED COUNT: 15.6 % (ref 11.5–14.5)
GLUCOSE SERPL-MCNC: 114 MG/DL (ref 74–99)
HCT VFR BLD AUTO: 42.5 % (ref 36–46)
HDLC SERPL-MCNC: 87.3 MG/DL
HGB BLD-MCNC: 13.9 G/DL (ref 12–16)
IMM GRANULOCYTES # BLD AUTO: 0.19 X10*3/UL (ref 0–0.7)
IMM GRANULOCYTES NFR BLD AUTO: 1.5 % (ref 0–0.9)
LDLC SERPL CALC-MCNC: 171 MG/DL
LYMPHOCYTES # BLD AUTO: 1.2 X10*3/UL (ref 1.2–4.8)
LYMPHOCYTES NFR BLD AUTO: 9.2 %
MCH RBC QN AUTO: 28 PG (ref 26–34)
MCHC RBC AUTO-ENTMCNC: 32.7 G/DL (ref 32–36)
MCV RBC AUTO: 86 FL (ref 80–100)
MONOCYTES # BLD AUTO: 0.48 X10*3/UL (ref 0.1–1)
MONOCYTES NFR BLD AUTO: 3.7 %
NEUTROPHILS # BLD AUTO: 11.18 X10*3/UL (ref 1.2–7.7)
NEUTROPHILS NFR BLD AUTO: 85.2 %
NON HDL CHOLESTEROL: 186 MG/DL (ref 0–149)
NRBC BLD-RTO: 0 /100 WBCS (ref 0–0)
PLATELET # BLD AUTO: 196 X10*3/UL (ref 150–450)
POTASSIUM SERPL-SCNC: 4.5 MMOL/L (ref 3.5–5.3)
PROT SERPL-MCNC: 6.6 G/DL (ref 6.4–8.2)
RBC # BLD AUTO: 4.96 X10*6/UL (ref 4–5.2)
SODIUM SERPL-SCNC: 135 MMOL/L (ref 136–145)
T4 FREE SERPL-MCNC: 0.93 NG/DL (ref 0.61–1.12)
TRIGL SERPL-MCNC: 76 MG/DL (ref 0–149)
TSH SERPL-ACNC: 0.31 MIU/L (ref 0.44–3.98)
VLDL: 15 MG/DL (ref 0–40)
WBC # BLD AUTO: 13.1 X10*3/UL (ref 4.4–11.3)

## 2024-07-25 PROCEDURE — 86803 HEPATITIS C AB TEST: CPT

## 2024-07-25 PROCEDURE — 83036 HEMOGLOBIN GLYCOSYLATED A1C: CPT

## 2024-07-25 PROCEDURE — 76705 ECHO EXAM OF ABDOMEN: CPT | Performed by: STUDENT IN AN ORGANIZED HEALTH CARE EDUCATION/TRAINING PROGRAM

## 2024-07-25 PROCEDURE — 36415 COLL VENOUS BLD VENIPUNCTURE: CPT

## 2024-07-25 PROCEDURE — 76705 ECHO EXAM OF ABDOMEN: CPT

## 2024-07-26 LAB
EST. AVERAGE GLUCOSE BLD GHB EST-MCNC: 123 MG/DL
HBA1C MFR BLD: 5.9 %
HCV AB SER QL: NONREACTIVE

## 2024-07-29 ENCOUNTER — HOSPITAL ENCOUNTER (OUTPATIENT)
Dept: RADIOLOGY | Facility: EXTERNAL LOCATION | Age: 64
Discharge: HOME | End: 2024-07-29
Payer: COMMERCIAL

## 2024-07-29 DIAGNOSIS — Z12.31 ENCOUNTER FOR SCREENING MAMMOGRAM FOR BREAST CANCER: ICD-10-CM

## 2024-07-30 DIAGNOSIS — R92.8 ABNORMAL MAMMOGRAM: Primary | ICD-10-CM

## 2024-07-31 ENCOUNTER — APPOINTMENT (OUTPATIENT)
Dept: RADIOLOGY | Facility: HOSPITAL | Age: 64
End: 2024-07-31
Payer: COMMERCIAL

## 2024-07-31 DIAGNOSIS — R92.8 ABNORMAL MAMMOGRAM: ICD-10-CM

## 2024-08-07 ENCOUNTER — APPOINTMENT (OUTPATIENT)
Dept: SURGERY | Facility: CLINIC | Age: 64
End: 2024-08-07
Payer: COMMERCIAL

## 2024-08-07 VITALS
SYSTOLIC BLOOD PRESSURE: 144 MMHG | HEIGHT: 63 IN | HEART RATE: 75 BPM | DIASTOLIC BLOOD PRESSURE: 75 MMHG | WEIGHT: 170 LBS | BODY MASS INDEX: 30.12 KG/M2

## 2024-08-07 DIAGNOSIS — K43.9 VENTRAL HERNIA WITHOUT OBSTRUCTION OR GANGRENE: Primary | ICD-10-CM

## 2024-08-07 PROCEDURE — 3078F DIAST BP <80 MM HG: CPT | Performed by: SURGERY

## 2024-08-07 PROCEDURE — 1159F MED LIST DOCD IN RCRD: CPT | Performed by: SURGERY

## 2024-08-07 PROCEDURE — 1123F ACP DISCUSS/DSCN MKR DOCD: CPT | Performed by: SURGERY

## 2024-08-07 PROCEDURE — 99213 OFFICE O/P EST LOW 20 MIN: CPT | Performed by: SURGERY

## 2024-08-07 PROCEDURE — 1036F TOBACCO NON-USER: CPT | Performed by: SURGERY

## 2024-08-07 PROCEDURE — 3077F SYST BP >= 140 MM HG: CPT | Performed by: SURGERY

## 2024-08-07 PROCEDURE — 3008F BODY MASS INDEX DOCD: CPT | Performed by: SURGERY

## 2024-08-07 ASSESSMENT — ENCOUNTER SYMPTOMS: ABDOMINAL DISTENTION: 1

## 2024-08-07 NOTE — PROGRESS NOTES
Subjective   Patient ID: Martir Granger is a 63 y.o. female who presents for Follow-up.  HPI  53-year-old female here for recheck of ventral hernia.  Patient has continued to lose weight.  She has bulging but minor pain in the right lower quadrant hernia site  Review of Systems   Gastrointestinal:  Positive for abdominal distention.   All other systems reviewed and are negative.      Objective   Physical Exam  Abdominal:      Comments: Abdomen soft significant weight loss since last visit partially reducible nontender right lower quadrant hernia unchanged in size no skin changes     Physical Exam  Constitutional:       Appearance: Normal appearance.   HENT:      Head: Normocephalic and atraumatic.      Mouth/Throat:      Pharynx: Oropharynx is clear.   Eyes:      Pupils: Pupils are equal, round, and reactive to light.   Cardiovascular:      Rate and Rhythm: Normal rate and regular rhythm.   Pulmonary:      Effort: Pulmonary effort is normal.      Breath sounds: Normal breath sounds.   Abdominal:      General: Abdomen is flat. Bowel sounds are normal.      Palpations: Abdomen is soft.   Musculoskeletal:         General: Normal range of motion.      Cervical back: Normal range of motion.   Skin:     General: Skin is warm.   Neurological:      General: No focal deficit present.      Mental Status: She is alert. Mental status is at baseline.   Psychiatric:         Mood and Affect: Mood normal.       Assessment/Plan   Patient continues to lose weight with excellent results hernia symptoms still extremely mild no indications for surgery at this time recommend follow-up in a year to assess her at that time for repair if symptoms increase         Avel Scanlon MD 08/07/24 12:06 PM

## 2024-08-15 ENCOUNTER — HOSPITAL ENCOUNTER (OUTPATIENT)
Dept: RADIOLOGY | Facility: HOSPITAL | Age: 64
Discharge: HOME | End: 2024-08-15
Payer: COMMERCIAL

## 2024-08-15 VITALS — HEIGHT: 64 IN | BODY MASS INDEX: 29.02 KG/M2 | WEIGHT: 170 LBS

## 2024-08-15 DIAGNOSIS — R92.8 ABNORMAL MAMMOGRAM: ICD-10-CM

## 2024-08-15 DIAGNOSIS — R92.8 OTHER ABNORMAL AND INCONCLUSIVE FINDINGS ON DIAGNOSTIC IMAGING OF BREAST: ICD-10-CM

## 2024-08-15 PROCEDURE — 77061 BREAST TOMOSYNTHESIS UNI: CPT | Mod: RT

## 2024-08-15 PROCEDURE — 76642 ULTRASOUND BREAST LIMITED: CPT | Mod: RT

## 2024-08-21 ENCOUNTER — HOSPITAL ENCOUNTER (OUTPATIENT)
Dept: RADIOLOGY | Facility: HOSPITAL | Age: 64
Discharge: HOME | End: 2024-08-21
Payer: COMMERCIAL

## 2024-08-21 DIAGNOSIS — R59.0 INGUINAL ADENOPATHY: ICD-10-CM

## 2024-08-21 PROCEDURE — 76882 US LMTD JT/FCL EVL NVASC XTR: CPT

## 2024-08-21 PROCEDURE — 76882 US LMTD JT/FCL EVL NVASC XTR: CPT | Performed by: RADIOLOGY

## 2024-09-03 ENCOUNTER — HOSPITAL ENCOUNTER (OUTPATIENT)
Dept: RADIOLOGY | Facility: CLINIC | Age: 64
Discharge: HOME | End: 2024-09-03
Payer: COMMERCIAL

## 2024-09-03 DIAGNOSIS — Z13.6 SCREENING FOR CARDIOVASCULAR CONDITION: ICD-10-CM

## 2024-09-03 PROCEDURE — 75571 CT HRT W/O DYE W/CA TEST: CPT

## 2024-09-25 DIAGNOSIS — N18.31 STAGE 3A CHRONIC KIDNEY DISEASE (MULTI): ICD-10-CM

## 2024-09-25 DIAGNOSIS — N32.81 OAB (OVERACTIVE BLADDER): ICD-10-CM

## 2024-09-25 RX ORDER — OXYBUTYNIN CHLORIDE 5 MG/1
5 TABLET, EXTENDED RELEASE ORAL DAILY
Qty: 30 TABLET | Refills: 1 | Status: SHIPPED | OUTPATIENT
Start: 2024-09-25

## 2024-10-22 ENCOUNTER — APPOINTMENT (OUTPATIENT)
Dept: PRIMARY CARE | Facility: CLINIC | Age: 64
End: 2024-10-22
Payer: COMMERCIAL

## 2024-10-22 VITALS
WEIGHT: 179 LBS | TEMPERATURE: 96.6 F | HEIGHT: 64 IN | SYSTOLIC BLOOD PRESSURE: 120 MMHG | HEART RATE: 78 BPM | DIASTOLIC BLOOD PRESSURE: 80 MMHG | BODY MASS INDEX: 30.56 KG/M2

## 2024-10-22 DIAGNOSIS — D18.03 LIVER HEMANGIOMA: ICD-10-CM

## 2024-10-22 DIAGNOSIS — I10 ESSENTIAL HYPERTENSION, BENIGN: Primary | ICD-10-CM

## 2024-10-22 PROCEDURE — 1123F ACP DISCUSS/DSCN MKR DOCD: CPT | Performed by: INTERNAL MEDICINE

## 2024-10-22 PROCEDURE — 3074F SYST BP LT 130 MM HG: CPT | Performed by: INTERNAL MEDICINE

## 2024-10-22 PROCEDURE — 1036F TOBACCO NON-USER: CPT | Performed by: INTERNAL MEDICINE

## 2024-10-22 PROCEDURE — 99213 OFFICE O/P EST LOW 20 MIN: CPT | Performed by: INTERNAL MEDICINE

## 2024-10-22 PROCEDURE — 1159F MED LIST DOCD IN RCRD: CPT | Performed by: INTERNAL MEDICINE

## 2024-10-22 PROCEDURE — 1160F RVW MEDS BY RX/DR IN RCRD: CPT | Performed by: INTERNAL MEDICINE

## 2024-10-22 PROCEDURE — 3008F BODY MASS INDEX DOCD: CPT | Performed by: INTERNAL MEDICINE

## 2024-10-22 PROCEDURE — 3079F DIAST BP 80-89 MM HG: CPT | Performed by: INTERNAL MEDICINE

## 2024-10-22 ASSESSMENT — COLUMBIA-SUICIDE SEVERITY RATING SCALE - C-SSRS
2. HAVE YOU ACTUALLY HAD ANY THOUGHTS OF KILLING YOURSELF?: NO
1. IN THE PAST MONTH, HAVE YOU WISHED YOU WERE DEAD OR WISHED YOU COULD GO TO SLEEP AND NOT WAKE UP?: NO
6. HAVE YOU EVER DONE ANYTHING, STARTED TO DO ANYTHING, OR PREPARED TO DO ANYTHING TO END YOUR LIFE?: NO

## 2024-10-22 ASSESSMENT — ENCOUNTER SYMPTOMS
PSYCHIATRIC NEGATIVE: 1
MUSCULOSKELETAL NEGATIVE: 1
CONSTITUTIONAL NEGATIVE: 1
NEUROLOGICAL NEGATIVE: 1
OCCASIONAL FEELINGS OF UNSTEADINESS: 0
CARDIOVASCULAR NEGATIVE: 1
DEPRESSION: 0
HYPERTENSION: 1
GASTROINTESTINAL NEGATIVE: 1
HEADACHES: 0
LOSS OF SENSATION IN FEET: 0
RESPIRATORY NEGATIVE: 1

## 2024-10-22 ASSESSMENT — PATIENT HEALTH QUESTIONNAIRE - PHQ9
1. LITTLE INTEREST OR PLEASURE IN DOING THINGS: NOT AT ALL
SUM OF ALL RESPONSES TO PHQ9 QUESTIONS 1 AND 2: 0
2. FEELING DOWN, DEPRESSED OR HOPELESS: NOT AT ALL

## 2024-10-22 NOTE — PROGRESS NOTES
Subjective   Patient ID: Martir Granger is a 64 y.o. female who presents for Follow-up (3 mo fu).  Hypertension  This is a chronic problem. The current episode started more than 1 year ago. The problem has been resolved since onset. The problem is controlled. Pertinent negatives include no anxiety, chest pain, headaches, malaise/fatigue or peripheral edema. There are no associated agents to hypertension. Risk factors for coronary artery disease include obesity, sedentary lifestyle and post-menopausal state. Past treatments include calcium channel blockers. The current treatment provides moderate improvement. There are no compliance problems.  There is no history of angina or kidney disease. There is no history of chronic renal disease.       Past Medical History  History reviewed. No pertinent past medical history.    Social History  Social History     Tobacco Use    Smoking status: Former     Types: Cigarettes    Smokeless tobacco: Former   Vaping Use    Vaping status: Every Day    Substances: Nicotine   Substance Use Topics    Alcohol use: Not Currently    Drug use: Yes     Types: Marijuana       Family History     Family History   Problem Relation Name Age of Onset    Lung cancer Mother      Lung cancer Father         Allergies:  Allergies   Allergen Reactions    Lisinopril Cough    Latex Rash        Outpatient Medications:  Current Outpatient Medications   Medication Instructions    amLODIPine (NORVASC) 5 mg, oral, Daily    oxybutynin XL (DITROPAN-XL) 5 mg, oral, Daily        Review of Systems   Constitutional: Negative.  Negative for malaise/fatigue.   Respiratory: Negative.     Cardiovascular: Negative.  Negative for chest pain.   Gastrointestinal: Negative.    Musculoskeletal: Negative.    Skin:  Negative for rash.   Neurological: Negative.  Negative for headaches.   Psychiatric/Behavioral: Negative.           Objective       Physical Exam  Vitals reviewed.   Constitutional:       Appearance: Normal  "appearance. She is obese.   HENT:      Head: Normocephalic.   Eyes:      Conjunctiva/sclera: Conjunctivae normal.   Cardiovascular:      Rate and Rhythm: Normal rate and regular rhythm.      Pulses: Normal pulses.   Pulmonary:      Effort: Pulmonary effort is normal.      Breath sounds: Normal breath sounds.   Abdominal:      Palpations: Abdomen is soft.      Hernia: A hernia is present. Hernia is present in the umbilical area.   Musculoskeletal:         General: Deformity present.      Cervical back: Neck supple.   Skin:     General: Skin is warm and dry.   Neurological:      General: No focal deficit present.   Psychiatric:         Mood and Affect: Mood normal.     /80 (BP Location: Right arm, Patient Position: Sitting, BP Cuff Size: Adult)   Pulse 78   Temp 35.9 °C (96.6 °F) (Temporal)   Ht 1.626 m (5' 4\")   Wt 81.2 kg (179 lb)   BMI 30.73 kg/m²      Assessment/Plan   Problem List Items Addressed This Visit       Essential hypertension, benign - Primary    Liver hemangioma    Relevant Orders    MR liver w and wo IV contrast   Reports were reviewed, coronary calcium score was 0, they are talking about 2 lesions in the liver, they are talking about these lesions on the ultrasound and also on previous CT imaging, MRI was suggested, radiologist is asking to do MRI, I told patient our dilemma that this radiologist Keep on asking more test, to me it appears to be a liver hemangioma but she is a survivor of uterine carcinoma so I offered her to have MRI of the liver.  Mammography was category 3, at next appointment I will order 6 monthly mammography.  Hemoglobin A1c was 5.9, she is doing well with amlodipine, creatinine was normalized.  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  She prefers to do MRI in open MRI, hemangioma will be approved, follow-up in 4 months.  "

## 2024-11-23 DIAGNOSIS — N32.81 OAB (OVERACTIVE BLADDER): ICD-10-CM

## 2024-11-23 RX ORDER — OXYBUTYNIN CHLORIDE 5 MG/1
5 TABLET, EXTENDED RELEASE ORAL DAILY
Qty: 30 TABLET | Refills: 1 | Status: SHIPPED | OUTPATIENT
Start: 2024-11-23

## 2024-12-23 DIAGNOSIS — I10 PRIMARY HYPERTENSION: ICD-10-CM

## 2024-12-23 RX ORDER — AMLODIPINE BESYLATE 5 MG/1
5 TABLET ORAL DAILY
Qty: 30 TABLET | Refills: 2 | Status: SHIPPED | OUTPATIENT
Start: 2024-12-23

## 2025-01-21 ENCOUNTER — APPOINTMENT (OUTPATIENT)
Dept: PRIMARY CARE | Facility: CLINIC | Age: 65
End: 2025-01-21
Payer: COMMERCIAL

## 2025-01-22 DIAGNOSIS — N32.81 OAB (OVERACTIVE BLADDER): ICD-10-CM

## 2025-01-22 RX ORDER — OXYBUTYNIN CHLORIDE 5 MG/1
5 TABLET, EXTENDED RELEASE ORAL DAILY
Qty: 30 TABLET | Refills: 0 | Status: SHIPPED | OUTPATIENT
Start: 2025-01-22

## 2025-02-25 DIAGNOSIS — N32.81 OAB (OVERACTIVE BLADDER): ICD-10-CM

## 2025-02-25 RX ORDER — OXYBUTYNIN CHLORIDE 5 MG/1
5 TABLET, EXTENDED RELEASE ORAL DAILY
Qty: 30 TABLET | Refills: 1 | Status: SHIPPED | OUTPATIENT
Start: 2025-02-25

## 2025-02-27 DIAGNOSIS — R92.321 SCATTERED FIBROGLANDULAR TISSUE DENSITY OF RIGHT BREAST ON MAMMOGRAPHY: ICD-10-CM

## 2025-03-25 ENCOUNTER — APPOINTMENT (OUTPATIENT)
Dept: PRIMARY CARE | Facility: CLINIC | Age: 65
End: 2025-03-25
Payer: COMMERCIAL

## 2025-04-03 DIAGNOSIS — I10 PRIMARY HYPERTENSION: ICD-10-CM

## 2025-04-03 RX ORDER — AMLODIPINE BESYLATE 5 MG/1
5 TABLET ORAL DAILY
Qty: 30 TABLET | Refills: 2 | Status: SHIPPED | OUTPATIENT
Start: 2025-04-03

## 2025-04-16 ENCOUNTER — HOSPITAL ENCOUNTER (OUTPATIENT)
Dept: RADIOLOGY | Facility: HOSPITAL | Age: 65
Discharge: HOME | End: 2025-04-16
Payer: COMMERCIAL

## 2025-04-16 DIAGNOSIS — R92.321 SCATTERED FIBROGLANDULAR TISSUE DENSITY OF RIGHT BREAST ON MAMMOGRAPHY: ICD-10-CM

## 2025-04-16 PROCEDURE — G0279 TOMOSYNTHESIS, MAMMO: HCPCS | Mod: RIGHT SIDE | Performed by: RADIOLOGY

## 2025-04-16 PROCEDURE — 77061 BREAST TOMOSYNTHESIS UNI: CPT | Mod: RT

## 2025-04-16 PROCEDURE — 77065 DX MAMMO INCL CAD UNI: CPT | Mod: RIGHT SIDE | Performed by: RADIOLOGY

## 2025-04-23 ENCOUNTER — APPOINTMENT (OUTPATIENT)
Dept: RADIOLOGY | Facility: HOSPITAL | Age: 65
End: 2025-04-23
Payer: COMMERCIAL

## 2025-04-30 ENCOUNTER — APPOINTMENT (OUTPATIENT)
Dept: PRIMARY CARE | Facility: CLINIC | Age: 65
End: 2025-04-30
Payer: COMMERCIAL

## 2025-04-30 VITALS
HEIGHT: 64 IN | HEART RATE: 58 BPM | WEIGHT: 179 LBS | SYSTOLIC BLOOD PRESSURE: 134 MMHG | DIASTOLIC BLOOD PRESSURE: 80 MMHG | BODY MASS INDEX: 30.56 KG/M2 | TEMPERATURE: 96.2 F

## 2025-04-30 DIAGNOSIS — E66.09 CLASS 1 OBESITY DUE TO EXCESS CALORIES WITHOUT SERIOUS COMORBIDITY WITH BODY MASS INDEX (BMI) OF 30.0 TO 30.9 IN ADULT: ICD-10-CM

## 2025-04-30 DIAGNOSIS — Z78.0 POSTMENOPAUSE: ICD-10-CM

## 2025-04-30 DIAGNOSIS — Z00.00 ROUTINE GENERAL MEDICAL EXAMINATION AT HEALTH CARE FACILITY: Primary | ICD-10-CM

## 2025-04-30 DIAGNOSIS — I10 PRIMARY HYPERTENSION: ICD-10-CM

## 2025-04-30 DIAGNOSIS — C54.1 ENDOMETRIAL CANCER (MULTI): ICD-10-CM

## 2025-04-30 DIAGNOSIS — E66.811 CLASS 1 OBESITY DUE TO EXCESS CALORIES WITHOUT SERIOUS COMORBIDITY WITH BODY MASS INDEX (BMI) OF 30.0 TO 30.9 IN ADULT: ICD-10-CM

## 2025-04-30 DIAGNOSIS — N32.81 OAB (OVERACTIVE BLADDER): ICD-10-CM

## 2025-04-30 DIAGNOSIS — D18.03 LIVER HEMANGIOMA: ICD-10-CM

## 2025-04-30 PROCEDURE — 3079F DIAST BP 80-89 MM HG: CPT | Performed by: INTERNAL MEDICINE

## 2025-04-30 PROCEDURE — 3008F BODY MASS INDEX DOCD: CPT | Performed by: INTERNAL MEDICINE

## 2025-04-30 PROCEDURE — 3075F SYST BP GE 130 - 139MM HG: CPT | Performed by: INTERNAL MEDICINE

## 2025-04-30 PROCEDURE — 1159F MED LIST DOCD IN RCRD: CPT | Performed by: INTERNAL MEDICINE

## 2025-04-30 PROCEDURE — 1123F ACP DISCUSS/DSCN MKR DOCD: CPT | Performed by: INTERNAL MEDICINE

## 2025-04-30 PROCEDURE — G0438 PPPS, INITIAL VISIT: HCPCS | Performed by: INTERNAL MEDICINE

## 2025-04-30 PROCEDURE — 1036F TOBACCO NON-USER: CPT | Performed by: INTERNAL MEDICINE

## 2025-04-30 PROCEDURE — 1170F FXNL STATUS ASSESSED: CPT | Performed by: INTERNAL MEDICINE

## 2025-04-30 RX ORDER — AMLODIPINE BESYLATE 5 MG/1
5 TABLET ORAL DAILY
Qty: 90 TABLET | Refills: 3 | Status: SHIPPED | OUTPATIENT
Start: 2025-04-30

## 2025-04-30 RX ORDER — OXYBUTYNIN CHLORIDE 5 MG/1
5 TABLET, EXTENDED RELEASE ORAL DAILY
Qty: 90 TABLET | Refills: 3 | Status: SHIPPED | OUTPATIENT
Start: 2025-04-30 | End: 2025-05-04

## 2025-04-30 ASSESSMENT — ENCOUNTER SYMPTOMS
CONSTITUTIONAL NEGATIVE: 1
DEPRESSION: 0
GASTROINTESTINAL NEGATIVE: 1
CARDIOVASCULAR NEGATIVE: 1
MUSCULOSKELETAL NEGATIVE: 1
NEUROLOGICAL NEGATIVE: 1
RESPIRATORY NEGATIVE: 1
LOSS OF SENSATION IN FEET: 0
OCCASIONAL FEELINGS OF UNSTEADINESS: 0

## 2025-04-30 ASSESSMENT — ACTIVITIES OF DAILY LIVING (ADL)
NEEDS ASSISTANCE WITH FOOD: INDEPENDENT
ADEQUATE_TO_COMPLETE_ADL: YES
USING TELEPHONE: INDEPENDENT
JUDGMENT_ADEQUATE_SAFELY_COMPLETE_DAILY_ACTIVITIES: YES
GROOMING: INDEPENDENT
DRESSING YOURSELF: INDEPENDENT
GROCERY SHOPPING: INDEPENDENT
TAKING MEDICATION: INDEPENDENT
HEARING - RIGHT EAR: FUNCTIONAL
STIL DRIVING: YES
PATIENT'S MEMORY ADEQUATE TO SAFELY COMPLETE DAILY ACTIVITIES?: YES
BATHING: INDEPENDENT
TOILETING: INDEPENDENT
DOING HOUSEWORK: INDEPENDENT
HEARING - LEFT EAR: FUNCTIONAL
MANAGING FINANCES: INDEPENDENT
WALKS IN HOME: INDEPENDENT
FEEDING YOURSELF: INDEPENDENT
USING TRANSPORTATION: INDEPENDENT
PREPARING MEALS: INDEPENDENT
EATING: INDEPENDENT
PILL BOX USED: NO

## 2025-04-30 ASSESSMENT — COLUMBIA-SUICIDE SEVERITY RATING SCALE - C-SSRS
1. IN THE PAST MONTH, HAVE YOU WISHED YOU WERE DEAD OR WISHED YOU COULD GO TO SLEEP AND NOT WAKE UP?: NO
2. HAVE YOU ACTUALLY HAD ANY THOUGHTS OF KILLING YOURSELF?: NO
6. HAVE YOU EVER DONE ANYTHING, STARTED TO DO ANYTHING, OR PREPARED TO DO ANYTHING TO END YOUR LIFE?: NO

## 2025-04-30 ASSESSMENT — PATIENT HEALTH QUESTIONNAIRE - PHQ9
1. LITTLE INTEREST OR PLEASURE IN DOING THINGS: NOT AT ALL
SUM OF ALL RESPONSES TO PHQ9 QUESTIONS 1 AND 2: 0
1. LITTLE INTEREST OR PLEASURE IN DOING THINGS: NOT AT ALL
2. FEELING DOWN, DEPRESSED OR HOPELESS: NOT AT ALL
2. FEELING DOWN, DEPRESSED OR HOPELESS: NOT AT ALL
SUM OF ALL RESPONSES TO PHQ9 QUESTIONS 1 AND 2: 0

## 2025-04-30 NOTE — ASSESSMENT & PLAN NOTE
Orders:    CT liver w and wo IV contrast; Future    Creatinine, Serum; Future  Wellness exam was completed, unfortunately I have to do CT imaging to prove that this is hemangioma, can go for a CT scan of liver with and without contrast that can tell us whether this is hemangioma or not which is likely to be, her endometrial cancer remains in remission, she remains on oxybutynin controlling symptoms of OAB.  DEXA scan will be done, wellness exam related questions were asked and addressed, physical exam is devoid of any significant findings.  Daily routine has been stable and well-maintained she works part-time, she sleeps well, she has a limited community support, no cognitive impairment or depression or anhedonia.  Physical exam was done, there is no hepatomegaly.  As a part of the CT of liver with and without contrast creatinine is warranted.  Can proceed with imaging study, if hemangioma it can be left alone, energy level is decent, complete physical exam was done during the process of wellness exam, follow-up in 4 months.

## 2025-04-30 NOTE — PROGRESS NOTES
"Subjective   Reason for Visit: Martir Granger is an 64 y.o. female here for a Medicare Wellness visit.     Past Medical, Surgical, and Family History reviewed and updated in chart.    Reviewed all medications by prescribing practitioner or clinical pharmacist (such as prescriptions, OTCs, herbal therapies and supplements) and documented in the medical record.    64-year-old female patient was seen for wellness exam.  Patient has been doing well, she is single living herself, does not have any children, she does not have a living will so first of all I gave her paperwork for living will discuss with her about living will.  She was having abnormal creatinine which was normalized, her mammography was unremarkable, I ordered MRI of liver last October she did not do it, she says that she is claustrophobic, she was supposed to call here for tranquilizer but she never, if it is a liver cancer it would have been manifested by now and I told patient that this is hemangioma but I have to prove it.  She is devoid of any complaints, she is doing well, no ER visit, no acute ailments or concerns, patient has been happy with her life, no depression, her vaccinations are reviewed, could consider shingles vaccine pneumococcal vaccine.  She is a heavyset female patient otherwise.        Patient Care Team:  Ricardo Han MD as PCP - General (Internal Medicine)     Review of Systems   Constitutional: Negative.    Respiratory: Negative.     Cardiovascular: Negative.    Gastrointestinal: Negative.    Musculoskeletal: Negative.    Neurological: Negative.        Objective   Vitals:  Temp 35.7 °C (96.2 °F) (Temporal)   Ht 1.626 m (5' 4\")   Wt 81.2 kg (179 lb)   BMI 30.73 kg/m²       Physical Exam  Constitutional:       Appearance: Normal appearance. She is overweight.   HENT:      Head: Normocephalic.      Nose: Nose normal.   Eyes:      General: Scleral icterus: liver.      Conjunctiva/sclera: Conjunctivae normal.   Cardiovascular: "      Rate and Rhythm: Normal rate and regular rhythm.      Pulses: Normal pulses.      Heart sounds: Normal heart sounds.   Pulmonary:      Effort: Pulmonary effort is normal.      Breath sounds: Normal breath sounds.   Abdominal:      Palpations: Abdomen is soft.      Hernia: A hernia is present.   Musculoskeletal:         General: Normal range of motion.      Cervical back: Normal range of motion and neck supple.      Right lower leg: Edema present.      Left lower leg: Edema present.   Skin:     General: Skin is warm and dry.   Neurological:      General: No focal deficit present.      Mental Status: She is oriented to person, place, and time. Mental status is at baseline.   Psychiatric:         Mood and Affect: Mood normal.         Judgment: Judgment normal.       Assessment & Plan  Primary hypertension    Orders:    amLODIPine (Norvasc) 5 mg tablet; Take 1 tablet (5 mg) by mouth once daily.    OAB (overactive bladder)    Orders:    oxybutynin XL (Ditropan-XL) 5 mg 24 hr tablet; Take 1 tablet (5 mg) by mouth once daily.    Endometrial cancer (Multi)         Class 1 obesity due to excess calories without serious comorbidity with body mass index (BMI) of 30.0 to 30.9 in adult         Routine general medical examination at health care facility    Orders:    1 Year Follow Up In Primary Care - Wellness Exam; Future    Postmenopause    Orders:    XR DEXA bone density; Future    Liver hemangioma    Orders:    CT liver w and wo IV contrast; Future    Creatinine, Serum; Future  Wellness exam was completed, unfortunately I have to do CT imaging to prove that this is hemangioma, can go for a CT scan of liver with and without contrast that can tell us whether this is hemangioma or not which is likely to be, her endometrial cancer remains in remission, she remains on oxybutynin controlling symptoms of OAB.  DEXA scan will be done, wellness exam related questions were asked and addressed, physical exam is devoid of any  significant findings.  Daily routine has been stable and well-maintained she works part-time, she sleeps well, she has a limited community support, no cognitive impairment or depression or anhedonia.  Physical exam was done, there is no hepatomegaly.  As a part of the CT of liver with and without contrast creatinine is warranted.  Can proceed with imaging study, if hemangioma it can be left alone, energy level is decent, complete physical exam was done during the process of wellness exam, follow-up in 4 months.

## 2025-05-03 DIAGNOSIS — N32.81 OAB (OVERACTIVE BLADDER): ICD-10-CM

## 2025-05-04 RX ORDER — OXYBUTYNIN CHLORIDE 5 MG/1
5 TABLET, EXTENDED RELEASE ORAL DAILY
Qty: 30 TABLET | Refills: 11 | Status: SHIPPED | OUTPATIENT
Start: 2025-05-04

## 2025-05-07 ENCOUNTER — APPOINTMENT (OUTPATIENT)
Dept: RADIOLOGY | Facility: HOSPITAL | Age: 65
End: 2025-05-07
Payer: COMMERCIAL

## 2025-05-14 ENCOUNTER — APPOINTMENT (OUTPATIENT)
Dept: RADIOLOGY | Facility: HOSPITAL | Age: 65
End: 2025-05-14
Payer: COMMERCIAL

## 2025-05-14 DIAGNOSIS — D18.03 LIVER HEMANGIOMA: ICD-10-CM

## 2025-05-14 LAB
CREAT SERPL-MCNC: 0.9 MG/DL (ref 0.6–1.3)
GFR SERPL CREATININE-BSD FRML MDRD: 71 ML/MIN/1.73M*2

## 2025-05-14 PROCEDURE — 74160 CT ABDOMEN W/CONTRAST: CPT

## 2025-05-14 PROCEDURE — 2550000001 HC RX 255 CONTRASTS: Performed by: INTERNAL MEDICINE

## 2025-05-14 PROCEDURE — 82565 ASSAY OF CREATININE: CPT

## 2025-05-14 RX ADMIN — IOHEXOL 75 ML: 350 INJECTION, SOLUTION INTRAVENOUS at 07:59

## 2025-08-07 ENCOUNTER — NURSE TRIAGE (OUTPATIENT)
Dept: AUDIOLOGY | Facility: CLINIC | Age: 65
End: 2025-08-07
Payer: COMMERCIAL

## 2025-08-07 NOTE — TELEPHONE ENCOUNTER
[employer] Call notes copied by Katja Bejarano on Thursday August 07, 2025  5:15 PM  ------  Documentation by Katja Bejarano. [[id number]] 8/6/2025 12:02 PM  VE yearly audio/hac

## 2025-08-14 ENCOUNTER — NURSE TRIAGE (OUTPATIENT)
Dept: AUDIOLOGY | Facility: CLINIC | Age: 65
End: 2025-08-14
Payer: COMMERCIAL

## 2025-09-03 ENCOUNTER — APPOINTMENT (OUTPATIENT)
Dept: PRIMARY CARE | Facility: CLINIC | Age: 65
End: 2025-09-03
Payer: COMMERCIAL

## 2025-09-10 ENCOUNTER — APPOINTMENT (OUTPATIENT)
Dept: PRIMARY CARE | Facility: CLINIC | Age: 65
End: 2025-09-10
Payer: COMMERCIAL

## (undated) DEVICE — FORCEPS ENDOSCP BX OVL CUP SERR W/NEEDLE 2.3MM DIA 160CML

## (undated) DEVICE — TUBE SET 96 MM 64 MM H2O PERISTALTIC STD AUX CHANNEL

## (undated) DEVICE — ADAPTER FLSH PMP FLD MGMT GI IRRIG OFP 2 DISPOSABLE

## (undated) DEVICE — Device: Brand: ENDO SMARTCAP

## (undated) DEVICE — COVER DUST PERIMETER HUMPREY

## (undated) DEVICE — ENDO CARRY-ON PROCEDURE KIT: Brand: ENDO CARRY-ON PROCEDURE KIT

## (undated) DEVICE — SINGLE PORT MANIFOLD: Brand: NEPTUNE 2

## (undated) DEVICE — BRUSH ENDO CLN L90.5IN SHTH DIA1.7MM BRIST DIA5-7MM 2-6MM

## (undated) DEVICE — GLOVE ORANGE PI 8   MSG9080

## (undated) DEVICE — TUBING, SUCTION, 1/4" X 10', STRAIGHT: Brand: MEDLINE